# Patient Record
Sex: MALE | Race: WHITE | Employment: PART TIME | URBAN - METROPOLITAN AREA
[De-identification: names, ages, dates, MRNs, and addresses within clinical notes are randomized per-mention and may not be internally consistent; named-entity substitution may affect disease eponyms.]

---

## 2019-01-21 ENCOUNTER — TELEPHONE (OUTPATIENT)
Dept: ENDOCRINOLOGY | Age: 59
End: 2019-01-21

## 2019-01-21 ENCOUNTER — OFFICE VISIT (OUTPATIENT)
Dept: ENDOCRINOLOGY | Age: 59
End: 2019-01-21
Payer: COMMERCIAL

## 2019-01-21 VITALS — WEIGHT: 254.4 LBS | HEIGHT: 73 IN | BODY MASS INDEX: 33.72 KG/M2

## 2019-01-21 DIAGNOSIS — C73 THYROID CANCER (HCC): Primary | ICD-10-CM

## 2019-01-21 DIAGNOSIS — E55.9 VITAMIN D DEFICIENCY: ICD-10-CM

## 2019-01-21 DIAGNOSIS — E89.0 POST-SURGICAL HYPOTHYROIDISM: ICD-10-CM

## 2019-01-21 PROCEDURE — 99214 OFFICE O/P EST MOD 30 MIN: CPT | Performed by: INTERNAL MEDICINE

## 2019-01-21 RX ORDER — LEVOTHYROXINE SODIUM 0.2 MG/1
200 TABLET ORAL DAILY
COMMUNITY
End: 2019-01-21 | Stop reason: SDUPTHER

## 2019-01-21 RX ORDER — LEVOTHYROXINE SODIUM 0.2 MG/1
TABLET ORAL
Qty: 90 TABLET | Refills: 5 | Status: SHIPPED | OUTPATIENT
Start: 2019-01-21 | End: 2019-07-23 | Stop reason: SDUPTHER

## 2019-01-21 RX ORDER — PANTOPRAZOLE SODIUM 40 MG/1
40 TABLET, DELAYED RELEASE ORAL DAILY
COMMUNITY

## 2019-01-21 RX ORDER — CELECOXIB 200 MG/1
200 CAPSULE ORAL PRN
COMMUNITY

## 2019-01-21 RX ORDER — ATENOLOL 25 MG/1
25 TABLET ORAL DAILY
COMMUNITY

## 2019-01-21 RX ORDER — CYANOCOBALAMIN 1000 UG/ML
1000 INJECTION INTRAMUSCULAR; SUBCUTANEOUS ONCE
COMMUNITY

## 2019-01-21 RX ORDER — ACYCLOVIR 200 MG/1
200 CAPSULE ORAL NIGHTLY
COMMUNITY

## 2019-01-21 RX ORDER — LISINOPRIL 20 MG/1
20 TABLET ORAL DAILY
COMMUNITY

## 2019-01-21 RX ORDER — SIMVASTATIN 10 MG
10 TABLET ORAL NIGHTLY
COMMUNITY

## 2019-01-21 RX ORDER — CHOLECALCIFEROL (VITAMIN D3) 1250 MCG
CAPSULE ORAL
COMMUNITY

## 2019-01-21 RX ORDER — CHLORTHALIDONE 25 MG/1
12.5 TABLET ORAL DAILY
COMMUNITY

## 2019-01-21 RX ORDER — FLUTICASONE PROPIONATE 50 MCG
1 SPRAY, SUSPENSION (ML) NASAL PRN
COMMUNITY

## 2019-01-21 RX ORDER — GABAPENTIN 100 MG/1
100 CAPSULE ORAL 3 TIMES DAILY PRN
COMMUNITY

## 2019-01-21 RX ORDER — SILDENAFIL CITRATE 20 MG/1
20 TABLET ORAL PRN
COMMUNITY

## 2019-01-29 ENCOUNTER — TELEPHONE (OUTPATIENT)
Dept: ENDOCRINOLOGY | Age: 59
End: 2019-01-29

## 2019-02-07 DIAGNOSIS — C73 THYROID CANCER (HCC): ICD-10-CM

## 2019-02-08 ENCOUNTER — TELEPHONE (OUTPATIENT)
Dept: ENDOCRINOLOGY | Age: 59
End: 2019-02-08

## 2019-07-16 DIAGNOSIS — E89.0 POST-SURGICAL HYPOTHYROIDISM: ICD-10-CM

## 2019-07-16 DIAGNOSIS — C73 THYROID CANCER (HCC): ICD-10-CM

## 2019-07-16 LAB
T4 FREE: 1.3
TSH SERPL DL<=0.05 MIU/L-ACNC: 0.03 UIU/ML

## 2019-07-23 ENCOUNTER — TELEPHONE (OUTPATIENT)
Dept: ENDOCRINOLOGY | Age: 59
End: 2019-07-23

## 2019-07-23 ENCOUNTER — OFFICE VISIT (OUTPATIENT)
Dept: ENDOCRINOLOGY | Age: 59
End: 2019-07-23
Payer: COMMERCIAL

## 2019-07-23 VITALS
OXYGEN SATURATION: 98 % | HEART RATE: 72 BPM | HEIGHT: 73 IN | WEIGHT: 250 LBS | SYSTOLIC BLOOD PRESSURE: 128 MMHG | BODY MASS INDEX: 33.13 KG/M2 | RESPIRATION RATE: 16 BRPM | DIASTOLIC BLOOD PRESSURE: 78 MMHG

## 2019-07-23 DIAGNOSIS — E55.9 VITAMIN D DEFICIENCY: ICD-10-CM

## 2019-07-23 DIAGNOSIS — C73 THYROID CANCER (HCC): Primary | ICD-10-CM

## 2019-07-23 DIAGNOSIS — E89.0 POST-SURGICAL HYPOTHYROIDISM: ICD-10-CM

## 2019-07-23 PROCEDURE — 99214 OFFICE O/P EST MOD 30 MIN: CPT | Performed by: INTERNAL MEDICINE

## 2019-07-23 RX ORDER — LEVOTHYROXINE SODIUM 0.2 MG/1
TABLET ORAL
Qty: 90 TABLET | Refills: 5 | Status: SHIPPED | OUTPATIENT
Start: 2019-07-23 | End: 2020-01-20 | Stop reason: SDUPTHER

## 2019-07-23 NOTE — LETTER
ultrasound-guided FNA of the cervical lymph node. Thyroid neck ultrasound on 2012 in \[facility]\"", showed 7 x 5 mm hypoechoic nodule in the left thyroidectomy bed. This lymph node was too small for FNA. Neck MRI on 2012, did not show any significant abnormality, the lymph nodes seen on the prior ultrasound in [facility] did not look enlarged. Thyroid ultrasound on 2012(in [facility]): unchanged 5 x 3 mm left level 6 central compartment nodule, there was no worrisome cervical lymphadenopathy. Thyroid ultrasound on 2013(by Dr Wanda Cantu): no local recurrence, no cervical SAROJ, right neck level 6-4 3 mm hypoechoic nodule. 2014: Thyroid US; negative  2014, Thyrogen stimulated T, TSH:85(Thyroge stimulated TG was 11 in )  Thyrogen stimulated Tg level; 2015 --> TSH 33, TG 2.6     Whole Body scan 2016 --> negative for any evidence of recurrence     Thyroid US 2015, 2016, 2017 and 2018  at [facility] --> No evidence of recurrence and no abnormal LN   Thyroid US 2019 at [facility] --> No evidence of recurrence and no abnormal LN     Currently on levothyroxine 200 mcg daily and 1/2 tab   Most recent labs  2019 - TSH 0.033, Tg level 0.2   Tg 2019 0.2 was 0.2 was 0.5 was 0.3 was 0.4     Last year the patient was admitted to [facility] with SVT () and underwent successful cardiac ablation. He is currently stable follow up with cardiology.     The patient denies any new lumps, bumps in her neck, change in her voice, or shortness of breath    PAST MEDICAL HISTORY   Past Medical History:   Diagnosis Date    Hyperlipidemia     Hypertension     Post-surgical hypothyroidism     Thyroid cancer ([address]     Vitamin B12 deficiency     Vitamin D deficiency      PAST SURGICAL HISTORY   Past Surgical History:   Procedure Laterality Date    TOTAL THYROIDECTOMY      for thyroid cancer      SOCIAL HISTORY   Social History     Socioeconomic History

## 2019-07-23 NOTE — PROGRESS NOTES
5/2018  at [facility] --> No evidence of recurrence and no abnormal LN   Thyroid US 2/2019 at [facility] --> No evidence of recurrence and no abnormal LN     Currently on levothyroxine 200 mcg daily and 1/2 tab Sundary  Most recent labs  7/18/2019 - TSH 0.033, Tg level 0.2   Tg 7/2019 0.2 was 0.2 was 0.5 was 0.3 was 0.4     Last year the patient was admitted to [facility] with SVT () and underwent successful cardiac ablation. He is currently stable follow up with cardiology. The patient denies any new lumps, bumps in her neck, change in her voice, or shortness of breath    PAST MEDICAL HISTORY   Past Medical History:   Diagnosis Date    Hyperlipidemia     Hypertension     Post-surgical hypothyroidism     [facility] ([facility] [address]     Vitamin B12 deficiency     Vitamin D deficiency      PAST SURGICAL HISTORY   Past Surgical History:   Procedure Laterality Date    TOTAL THYROIDECTOMY      for thyroid cancer      SOCIAL HISTORY   Social History     Socioeconomic History    Marital status:      Spouse name: Not on file    Number of children: Not on file    Years of education: Not on file    Highest education level: Not on file   Occupational History    Not on file   Social Needs    Financial resource strain: Not on file    Food insecurity:     Worry: Not on file     Inability: Not on file    Transportation needs:     Medical: Not on file     Non-medical: Not on file   Tobacco Use    Smoking status: Former Smoker    Smokeless tobacco: Never Used    Tobacco comment: quit in 2004    Substance and Sexual Activity    Alcohol use:  Yes     Alcohol/week: 12.0 standard drinks     Types: 12 Cans of beer per week    Drug use: No    Sexual activity: Not on file   Lifestyle    Physical activity:     Days per week: Not on file     Minutes per session: Not on file    Stress: Not on file   Relationships    Social connections:     Talks on phone: Not on file     Gets together: Not on file     Attends [employer] service: Not on file     Active member of club or organization: Not on file     Attends meetings of clubs or organizations: Not on file     Relationship status: Not on file    Intimate partner violence:     Fear of current or ex partner: Not on file     Emotionally abused: Not on file     Physically abused: Not on file     Forced sexual activity: Not on file   Other Topics Concern    Not on file   Social History Narrative    Not on file     FAMILY HISTORY   Family History   Problem Relation Age of Onset    Stroke Father     Cancer Father         Head/Neck/Throat    Cancer Sister         Testicular     Thyroid Cancer Neg Hx      ALLERGIES AND DRUG REACTIONS   Allergies   Allergen Reactions    Penicillins     Strawberry (Diagnostic)        CURRENT MEDICATIONS     Current Outpatient Medications   Medication Sig Dispense Refill    fluticasone (FLONASE) 50 MCG/ACT nasal spray 1 spray by Each Nostril route as needed       atenolol (TENORMIN) 25 MG tablet Take 25 mg by mouth daily 6.25 tab in the am and 1 tablet at bedtime      pantoprazole (PROTONIX) 40 MG tablet Take 40 mg by mouth daily      aspirin 81 MG tablet Take 81 mg by mouth daily      lisinopril (PRINIVIL;ZESTRIL) 20 MG tablet Take 20 mg by mouth daily       Multiple Vitamin (MULTI-DAY VITAMINS PO) Take by mouth daily      simvastatin (ZOCOR) 10 MG tablet Take 10 mg by mouth nightly      Calcium-Magnesium 500-250 MG TABS Take by mouth daily      Coenzyme Q10 (COQ-10) 100 MG CAPS Take by mouth nightly      acyclovir (ZOVIRAX) 200 MG capsule Take 200 mg by mouth nightly      sildenafil (REVATIO) 20 MG tablet Take 20 mg by mouth as needed      gabapentin (NEURONTIN) 100 MG capsule Take 100 mg by mouth 3 times daily as needed. Art Parmar celecoxib (CELEBREX) 200 MG capsule Take 200 mg by mouth as needed for Pain      cyanocobalamin 1000 MCG/ML injection Inject 1,000 mcg into the muscle once Twice a month      Cholecalciferol (VITAMIN D3) 80438 · Goal TSH <0.5   · Tg level still detectable but low and stable. He continues to have detectable thyroglobulin levels without clinically significant diease. · Previous Thyroid US, Whole body scan, PET/CT and MRI have been negative  · Thyrogen stimulated thyroglobulin level was 2.6 in 8/14/2015  · Whole Body scan 8/2016 --> negative   · Will repeat Thyroid US at [facility] in 2/2020     Postsurgical hypothyroidism   · At goal, continue levothyroxine to 200 mcg, 1 tab 6 days a week and 1/2 tab on Sunday   · Goal TSH <0.5     vitD deficiency   · continue vitD supplements   · Encourage Wt beating exercise     Return in about 6 months (around 1/23/2020) for Thyroid cancer . The above issues were reviewed with the patient who understood and agreed with the plan. 30 minutes were spent today in management of this patient. More than 50% of time spent on counseling of patient on above diagnosis. Thank you for allowing us to participate in the care of this patient. Please do not hesitate to contact us with any additional questions. Diagnosis Orders   1. Thyroid cancer (HCC)  TSH without Reflex    Thyroglobulin    T4, Free    US Thyroid    Basic Metabolic Panel    levothyroxine (SYNTHROID) 200 MCG tablet   2. Post-surgical hypothyroidism  TSH without Reflex    Thyroglobulin    T4, Free    Basic Metabolic Panel    levothyroxine (SYNTHROID) 200 MCG tablet   3.  Vitamin D deficiency  US Thyroid       Clover Stringer MD  Endocrinologist, [facility])   [address] [facility] [address]   Phone: [phone]  Fax: [phone]

## 2019-07-23 NOTE — TELEPHONE ENCOUNTER
Pt scheduled for a thyroid US at [facility] main for Monday 12-9-19 at 8am. Patient is aware, and he knows where the facility is. I faxed the order to [facility] at [facility]. Please see the scanned media.

## 2019-12-11 DIAGNOSIS — C73 THYROID CANCER (HCC): ICD-10-CM

## 2019-12-11 DIAGNOSIS — E55.9 VITAMIN D DEFICIENCY: ICD-10-CM

## 2020-01-15 LAB
BUN BLDV-MCNC: NORMAL MG/DL
CALCIUM SERPL-MCNC: 9 MG/DL
CHLORIDE BLD-SCNC: NORMAL MMOL/L
CO2: NORMAL
CREAT SERPL-MCNC: 1.08 MG/DL
GFR CALCULATED: 74
GLUCOSE BLD-MCNC: NORMAL MG/DL
POTASSIUM SERPL-SCNC: 3.8 MMOL/L
SODIUM BLD-SCNC: 144 MMOL/L
T4 FREE: 1.33

## 2020-01-20 ENCOUNTER — OFFICE VISIT (OUTPATIENT)
Dept: ENDOCRINOLOGY | Age: 60
End: 2020-01-20
Payer: COMMERCIAL

## 2020-01-20 VITALS
OXYGEN SATURATION: 98 % | SYSTOLIC BLOOD PRESSURE: 142 MMHG | DIASTOLIC BLOOD PRESSURE: 82 MMHG | BODY MASS INDEX: 33.72 KG/M2 | RESPIRATION RATE: 16 BRPM | WEIGHT: 254.4 LBS | HEIGHT: 73 IN | HEART RATE: 63 BPM

## 2020-01-20 PROCEDURE — 99214 OFFICE O/P EST MOD 30 MIN: CPT | Performed by: INTERNAL MEDICINE

## 2020-01-20 RX ORDER — LEVOTHYROXINE SODIUM 0.2 MG/1
TABLET ORAL
Qty: 90 TABLET | Refills: 5 | Status: SHIPPED
Start: 2020-01-20 | End: 2020-07-30 | Stop reason: SDUPTHER

## 2020-01-20 NOTE — PROGRESS NOTES
I-131. Posttherapy scan on 2011, did not show any evidence of metastatic disease. 2011, thyroglobulin antibody was less than 20, thyroglobulin was 11.4, TSH was 78. 2011, neck CT did not show any lymphadenopathy or residual thyroid tissue. in 2012 , thyroglobulin level was detectable with suppressed TSH: TSH was 0.02, free T4 was 1.3, thyroglobulin was 1.4, with negative thyroglobulin antibodies. The thyroid ultrasound completed in [facility] suggested submandibular lymphadenopathy. At that time, we asked a second opinion from Dr. Nayeli White at [facility] , for further evaluation of detectable thyroglobulin levels and ultrasound-guided FNA of the cervical lymph node. Thyroid neck ultrasound on 2012 in [facility], showed 7 x 5 mm hypoechoic nodule in the left thyroidectomy bed. This lymph node was too small for FNA. Neck MRI on 2012, did not show any significant abnormality, the lymph nodes seen on the prior ultrasound in [facility] did not look enlarged. Thyroid ultrasound on 2012(in pres): unchanged 5 x 3 mm left level 6 central compartment nodule, there was no worrisome cervical lymphadenopathy. Thyroid ultrasound on 2013(by Dr Lynette Vaughan): no local recurrence, no cervical SAROJ, right neck level 6-4 3 mm hypoechoic nodule.      2014: Thyroid US; negative  2014, Thyrogen stimulated T, TSH:85(Thyroge stimulated TG was 11 in )  Thyrogen stimulated Tg level; 2015 --> TSH 33, TG 2.6     Whole Body scan 2016 --> negative for any evidence of recurrence     Thyroid US 2015, 2016, 2017 and 2018  at [facility] --> No evidence of recurrence and no abnormal LN     Thyroid US 2019 at [facility] --> No evidence of recurrence or abnormal LN     Recent US 2019 at [facility] --> A right level 2 lymph node exhibits lobulation, heterogeneous hypoechoic appearance and no evidence of a central Gets together: Not on file     Attends [employer] service: Not on file     Active member of club or organization: Not on file     Attends meetings of clubs or organizations: Not on file     Relationship status: Not on file    Intimate partner violence:     Fear of current or ex partner: Not on file     Emotionally abused: Not on file     Physically abused: Not on file     Forced sexual activity: Not on file   Other Topics Concern    Not on file   Social History Narrative    Not on file     FAMILY HISTORY   Family History   Problem Relation Age of Onset    Stroke Father     Cancer Father         Head/Neck/Throat    Cancer Sister         Testicular     Thyroid Cancer Neg Hx      ALLERGIES AND DRUG REACTIONS   Allergies   Allergen Reactions    Penicillins     Strawberry (Diagnostic)        CURRENT MEDICATIONS     Current Outpatient Medications   Medication Sig Dispense Refill    levothyroxine (SYNTHROID) 200 MCG tablet Take 1 tablet 6 days a week and on 1/2 tablet on Sunday 90 tablet 5    fluticasone (FLONASE) 50 MCG/ACT nasal spray 1 spray by Each Nostril route as needed       atenolol (TENORMIN) 25 MG tablet Take 25 mg by mouth daily 1 3rd of a 25 mg in the am and a 25 mg at night      pantoprazole (PROTONIX) 40 MG tablet Take 40 mg by mouth daily      aspirin 81 MG tablet Take 81 mg by mouth daily      lisinopril (PRINIVIL;ZESTRIL) 20 MG tablet Take 20 mg by mouth daily       Multiple Vitamin (MULTI-DAY VITAMINS PO) Take by mouth daily      simvastatin (ZOCOR) 10 MG tablet Take 10 mg by mouth nightly      Calcium-Magnesium 500-250 MG TABS Take by mouth daily      Coenzyme Q10 (COQ-10) 100 MG CAPS Take by mouth nightly      acyclovir (ZOVIRAX) 200 MG capsule Take 200 mg by mouth nightly      sildenafil (REVATIO) 20 MG tablet Take 20 mg by mouth as needed      gabapentin (NEURONTIN) 100 MG capsule Take 100 mg by mouth 3 times daily as needed. Radhames Isaac celecoxib (CELEBREX) 200 MG capsule Take 200 mg by mouth as needed for Pain      cyanocobalamin 1000 MCG/ML injection Inject 1,000 mcg into the muscle once Twice a month      Cholecalciferol (VITAMIN D3) 34885 units CAPS Take by mouth Twice a month      chlorthalidone (HYGROTON) 25 MG tablet Take 12.5 mg by mouth daily      thyrotropin juliane (THYROGEN) 1.1 MG injection Inject 0.9 mg into the muscle every 24 hours x2 doses. Lab work is to be done BEFORE the 1st dose is given and 48hrs AFTER the 2nd dose is given. 2 each 0     No current facility-administered medications for this visit. Review of Systems  Constitutional: No fever, no chills, no diaphoresis, no generalized weakness. HEENT: No blurred vision, No sore throat, no ear pain, no hair loss  Neck: denied any neck swelling, difficulty swallowing,   Cadrdiopulomary: No CP, SOB or palpitation, No orthopnea or PND. No cough or wheezing. GI: No N/V/D, no constipation, No abdominal pain, no melena or hematochezia   : Denied any dysuria, hematuria, flank pain, discharge, or incontinence. Skin: denied any rash, ulcer, Hirsute, or hyperpigmentation. MSK: denied any joint deformity, joint pain/swelling, muscle pain, or back pain. Neuro: no numbess, no tingling, no weakness,     OBJECTIVE    BP (!) 142/82 (Site: Left Upper Arm, Position: Sitting, Cuff Size: Medium Adult)   Pulse 63   Resp 16   Ht 6' 1\" (1.854 m)   Wt 254 lb 6.4 oz (115.4 kg)   SpO2 98%   BMI 33.56 kg/m²   BP Readings from Last 4 Encounters:   01/20/20 (!) 142/82   07/23/19 128/78     Wt Readings from Last 6 Encounters:   01/20/20 254 lb 6.4 oz (115.4 kg)   07/23/19 250 lb (113.4 kg)   01/21/19 254 lb 6.4 oz (115.4 kg)       Physical examination:  General: awake alert, oriented x3, no abnormal position or movements. HEENT: normocephalic non traumatic  Neck: supple, no LN enlargement, s/p total thyroidectomy, no JVD. Pulm: Clear equal air entry no added sounds, no wheezing or rhonchi    CVS: S1 + S2, no murmur, no heave.

## 2020-01-29 ENCOUNTER — TELEPHONE (OUTPATIENT)
Dept: ENDOCRINOLOGY | Age: 60
End: 2020-01-29

## 2020-01-29 NOTE — TELEPHONE ENCOUNTER
After reviewing the addendum to the US report done at [facility], did you still want to proceed with the thyrogen stim test?

## 2020-01-30 NOTE — TELEPHONE ENCOUNTER
Please let him know that [facility] couldn't find previous US images to compare with this US    I recommend proceedign with Thyrogen stimulated thyroglobulin and Whole body scan      Protocol for Thyrogen Stimulated I131 Whole Body Scan  Begin low iodine diet 10 days before day of 1st thyrogen injection   Decrease levothyroxine 10 days before day of 1st thyrogen injection     · Day 1 (monday): Urine sample for iodine and blood work (TSH, thyroglobulin), Thyrogen injection #1 in endocrine clinic   · Day 2 (Tuesday)  Thyrogen injection #2 in endocrine clinic   · Day 3 (Wednesday)  Blood work for thyroglobulin and TSH and go to nuclear medicine for I-123 GALINDO   · Day 4 (Thursday)  Whole body  scan in Nuclear Medicine

## 2020-02-28 ENCOUNTER — TELEPHONE (OUTPATIENT)
Dept: ENDOCRINOLOGY | Age: 60
End: 2020-02-28

## 2020-02-28 NOTE — TELEPHONE ENCOUNTER
I spoke to [employer] at [facility]. There is no auth necessary for Thyrogen [id number]. No auth necessary for the WBS as well WZR#[id number].

## 2020-03-16 LAB — TSH SERPL DL<=0.05 MIU/L-ACNC: 0.24 UIU/ML

## 2020-03-18 LAB — TSH SERPL DL<=0.05 MIU/L-ACNC: 92.6 UIU/ML

## 2020-03-25 ENCOUNTER — TELEPHONE (OUTPATIENT)
Dept: ENDOCRINOLOGY | Age: 60
End: 2020-03-25

## 2020-07-20 ENCOUNTER — VIRTUAL VISIT (OUTPATIENT)
Dept: ENDOCRINOLOGY | Age: 60
End: 2020-07-20
Payer: COMMERCIAL

## 2020-07-20 PROCEDURE — 99214 OFFICE O/P EST MOD 30 MIN: CPT | Performed by: INTERNAL MEDICINE

## 2020-07-20 NOTE — LETTER
[address] [place] [address] Department of Endocrinology Diabetes and Metabolism   [address] [place] [address]   Phone: [phone]  Fax: [phone]      Provider: Mary Cruz MD  Primary Care Physician: Peyton Brooks MD   Referring Provider: No ref. provider found    Patient: Ulises Romero  YOB: 1960  Date of Visit: 7/20/2020      Dear Dr. Peyton Brooks MD   I had the pleasure of seeing your patient Ulises Romero today at endocrine clinic for follow up visit and I enclosed a copy of the office visit completed today. Thank you very much for asking us to participate in the care of this very pleasant patient. Please don't hesitate to call if there are any further questions or concerns. Sincerely   Mary Cruz MD  Endocrinologist, [address] [address]   Phone: [phone]  Fax: [phone]      [address] [place] [address] Department of Endocrinology Diabetes and Metabolism   [address] [place] [address], [address] [place] [address],[place] [address]   Phone: [phone]  Fax: [phone]    Date of Service: 7/20/2020    Primary Care Physician: Peyton Brooks MD.  Provider: Mary Cruz MD       Reason for the visit:  Metastatic thyroid cancer, post surgical hypothyroidism     History of Present Illness: The history is provided by the patient. No  was used. Accuracy of the patient data is excellent. Alvin Lama is a very pleasant  62y.o. year old male seen today today for follow up visit on papillary thyroid cancer    He was first diagnosed with papillary thyroid cancer after a left hemithyroidectomy in December 2009; this showed a papillary thyroid cancer, diffuse sclerosing variant, focal clear cell morphology, 2.2 cm, stage IV, T4a N1a M0.  There was evidence of capsular invasion in superior thyroid and paratracheal soft tissue and presumed angiolymphatic invasion, carcinoma was focally present at the resection margin. 2 lymph nodes were resected and found to be positive for metastatic disease with largest measuring 0.9 cm. The patient underwent right completion thyroidectomy in January 2010. Received GALINDO-ablation, with withdrawal, 150 mCi I-131 on January 27, 2010. Postablation whole body scan on February 03, 2010, showed possible left neck submandibular increased activity with questionable disease between umbilicus and pubic symphysis. March 31, 2010, neck CT did not show any focal abnormality in the submandibular region. November 05, 2010, thyroglobulin was 16, TSH was not documented at that time. November 03, 2010, Thyrogen stimulated whole body scan did not show the previously described activity in the thyroid bed or submandibular region. No abnormal activity was seen, negative for metastasis. PET/CT on May 26, 2011, did not show any metastatic disease    July 2011, Thyrogen stimulated thyroglobulin level was positive 11, with negative thyroglobulin antibodies. He was treated again with 154 mCi of I-131. Posttherapy scan on July 27, 2011, did not show any evidence of metastatic disease. July 22, 2011, thyroglobulin antibody was less than 20, thyroglobulin was 11.4, TSH was 78. August 24, 2011, neck CT did not show any lymphadenopathy or residual thyroid tissue. in January 2012 , thyroglobulin level was detectable with suppressed TSH: TSH was 0.02, free T4 was 1.3, thyroglobulin was 1.4, with negative thyroglobulin antibodies. The thyroid ultrasound completed in [facility] suggested submandibular lymphadenopathy. At that time, we asked a second opinion from Dr. Toñito Mcqueen at [facility] , for further evaluation of detectable thyroglobulin levels and ultrasound-guided FNA of the cervical lymph node.     Thyroid neck ultrasound on January 26, 2012 in [facility], showed 7 x 5 mm hypoechoic nodule in the left thyroidectomy bed. This lymph node was too small for FNA. Neck MRI on 2012, did not show any significant abnormality, the lymph nodes seen on the prior ultrasound in [facility] did not look enlarged. Thyroid ultrasound on 2012(in [facility]): unchanged 5 x 3 mm left level 6 central compartment nodule, there was no worrisome cervical lymphadenopathy. Thyroid ultrasound on 2013(by Dr Charlie Lazo): no local recurrence, no cervical SAROJ, right neck level 6-4 3 mm hypoechoic nodule. 2014: Thyroid US; negative  2014, Thyrogen stimulated T, TSH:85(Thyroge stimulated TG was 11 in )  Thyrogen stimulated Tg level; 2015 --> TSH 33, TG 2.6     Whole Body scan 2016 --> negative for any evidence of recurrence     Thyroid US 2015, 2016, 2017 and 2018  at [facility] --> No evidence of recurrence and no abnormal LN     Thyroid US 2019 at [facility] --> No evidence of recurrence or abnormal LN     Recent US 2019 at [facility] --> A right level 2 lymph node exhibits lobulation, heterogeneous hypoechoic appearance and no evidence of a central hyperechoic zone. Dimensions are 15 x 10 x 6 mm. A similar appearing left level 3 lymph node exhibits dimensions of 14 x 6 x 5 mm    1/15/2020 - TSH 0.011, Free T4 1.33, Tg-level 0.2 (negative Ab)     Thyrogen stim test 3/18/2020 -->  TSH 92.6, Tg level 0.4 with negative Tg-Ab     3/20/2020 - [facility] --> There is no abnormal increased tracer activity in the  right or left thyroid bed to suggest residual or recurrent disease in the neck. Currently on levothyroxine 200 mcg daily and 1/2 tab   Due for labs now       Tg  0.2 was 0.2 was 0.5 was 0.3 was 0.4     Last year the patient was admitted to [facility] with SVT () and underwent successful cardiac ablation. He is currently stable follow up with cardiology.     The patient denies any new lumps, bumps in her neck, change in her voice,  gabapentin (NEURONTIN) 100 MG capsule Take 100 mg by mouth 3 times daily as needed. Normat Sandro celecoxib (CELEBREX) 200 MG capsule Take 200 mg by mouth as needed for Pain      cyanocobalamin 1000 MCG/ML injection Inject 1,000 mcg into the muscle once Twice a month      Cholecalciferol (VITAMIN D3) 41038 units CAPS Take by mouth Twice a month      chlorthalidone (HYGROTON) 25 MG tablet Take 12.5 mg by mouth daily       No current facility-administered medications for this visit. Review of Systems  Constitutional: No fever, no chills, no diaphoresis, no generalized weakness. HEENT: No blurred vision, No sore throat, no ear pain, no hair loss  Neck: denied any neck swelling, difficulty swallowing,   Cadrdiopulomary: No CP, SOB or palpitation, No orthopnea or PND. No cough or wheezing. GI: No N/V/D, no constipation, No abdominal pain, no melena or hematochezia   : Denied any dysuria, hematuria, flank pain, discharge, or incontinence. Skin: denied any rash, ulcer, Hirsute, or hyperpigmentation. MSK: denied any joint deformity, joint pain/swelling, muscle pain, or back pain. Neuro: no numbess, no tingling, no weakness,     OBJECTIVE    There were no vitals taken for this visit. BP Readings from Last 4 Encounters:   01/20/20 (!) 142/82   07/23/19 128/78     Wt Readings from Last 6 Encounters:   01/20/20 254 lb 6.4 oz (115.4 kg)   07/23/19 250 lb (113.4 kg)   01/21/19 254 lb 6.4 oz (115.4 kg)     Physical examination:  Due to this being a TeleHealth encounter, evaluation of the following organ systems is limited: Vitals/Constitutional/EENT/Resp/CV/GI//MS/Neuro/Skin/Heme-Lymph-Imm. Modified physical exam through Telemedicine camera    General: Communicating well via camera   Neck: no obvious neck mass. No obvious neck deformity     CVS: no distress   Chest: no distress.  Chest is moving with respiration    Extremities:  no visible tremor  Skin: No visible rashes as seen from camera Musculoskeletal: no visible deformity  Neuro: Alert and oriented to person, place, and time. Psychiatric: Normal mood and affect. Behavior is normal    Review of Laboratory Data:  I have reviewed the following:  No results found for: WBC, RBC, HGB, HCT, MCV, MCH, MCHC, RDW, PLT, MPV, GRANULOCYTES, BANDS   Lab Results   Component Value Date/Time     01/15/2020    K 3.8 01/15/2020    CALCIUM 9.0 01/15/2020      Lab Results   Component Value Date/Time    TSH 92.600 03/18/2020    T4FREE 1.33 01/15/2020     No results found for: LABA1C, GLUCOSE, MALBCR, LABMICR, LABCREA  Lab Results   Component Value Date/Time    TSH 92.600 03/18/2020    T4FREE 1.33 01/15/2020     No results found for: PTH  No results found for: LABA1C  No results found for: VITD25    TSH W/REFLEX TO FREE T4 (01/15/2020 7:24 AM EST)  Component Value   TSH  0.011    FREE T4 (01/15/2020 7:24 AM EST)  Component Value   FREE T4 1.33     THYROGLOBULIN PANEL (01/15/2020 7:24 AM EST)  Component Value   Thyroglobulin 0.2 (L)     Medical Records/Labs/Images review:   I personally reviewed and summarized previous records   All labs and imaging were reviewed independently     [address] is a 62y.o. -year-old male who was diagnosed with metastatic papillary thyroid cancer in December 2009, stage Romario, T4a N1a M0. Metastatic Papillary thyroid Cancer   · He is a high risk patient with biochemical incomplete response. Doing better now   · At goal, continue levothyroxine to 200 mcg, 1 tab 6 days a week and 1/2 tab on Sunday   · Goal TSH <0.5   · Tg level still detectable but stable: 0.2.  He continues to have detectable thyroglobulin levels without clinically significant disease  · Thyroid US at [facility] 12/19 demonstrated no residual thyroid gland tissue, but multiple bilateral cervical lymph nodes, including a right level 2 lymph node exhibits lobulation, heterogeneous hypoechoic appearance and no evidence of a central hyperechoic zone. Dimensions are 15 x 10 x 6 mm. A similar appearing left level 3 lymph node exhibits dimensions of 14 x 6 x 5 mm. · Thyrogen stim test 3/18/2020 -->  TSH 92.6, Tg level 0.4 with negative Tg-Ab   · 3/20/2020 - [facility] --> There is no abnormal increased tracer activity in the  right or left thyroid bed to suggest residual or recurrent disease in the neck. Postsurgical hypothyroidism   · Currently on evothyroxine to 200 mcg, 1 tab 6 days a week and 1/2 tab on Sunday   · Goal TSH <0.5  · Check TFT now     vitD deficiency   · continue vitD supplements   · Encourage Wt bearing exercise     Return in about 1 year (around 7/20/2021) for thyroid cancer . The above issues were reviewed with the patient who understood and agreed with the plan. Thank you for allowing us to participate in the care of this patient. Please do not hesitate to contact us with any additional questions. Diagnosis Orders   1. Thyroid cancer ([facility] [address]  TSH without Reflex    T4, Free    TSH without Reflex    Thyroglobulin    T4, Free    Basic Metabolic Panel    US Thyroid   2. Post-surgical hypothyroidism  TSH without Reflex    T4, Free    TSH without Reflex    T4, Free   3. Vitamin D deficiency  Basic Metabolic Panel       Pritesh Barr MD  Endocrinologist, [facility])   [address] [facility], [address] [place] [address],[facility] [address]   Phone: [phone]  Fax: [phone]  ----------------------------  An electronic signature was used to authenticate this note. Ga Ulrich MD on 7/20/2020 at [facility] were provided through a video synchronous discussion virtually to substitute for in-person clinic visit.     Pursuant to the emergency declaration under the [facility] [address] authority and the [place] and [employer] Act, this Virtual  Visit was conducted, with patient's consent, to reduce the patient's risk of exposure to COVID-19 and provide continuity of care.

## 2020-07-20 NOTE — PROGRESS NOTES
[address] [place] [address] Department of Endocrinology Diabetes and Metabolism   [address] [facility] [address]   Phone: [phone]  Fax: [phone]    Date of Service: 7/20/2020    Primary Care Physician: Foreign Rodriguez MD.  Provider: Socrates Stovall MD       Reason for the visit:  Metastatic thyroid cancer, post surgical hypothyroidism     History of Present Illness: The history is provided by the patient. No  was used. Accuracy of the patient data is excellent. Derick Llamas is a very pleasant  62y.o. year old male seen today today for follow up visit on papillary thyroid cancer    He was first diagnosed with papillary thyroid cancer after a left hemithyroidectomy in December 2009; this showed a papillary thyroid cancer, diffuse sclerosing variant, focal clear cell morphology, 2.2 cm, stage IV, T4a N1a M0. There was evidence of capsular invasion in superior thyroid and paratracheal soft tissue and presumed angiolymphatic invasion, carcinoma was focally present at the resection margin. 2 lymph nodes were resected and found to be positive for metastatic disease with largest measuring 0.9 cm. The patient underwent right completion thyroidectomy in January 2010. Received GALINDO-ablation, with withdrawal, 150 mCi I-131 on January 27, 2010. Postablation whole body scan on February 03, 2010, showed possible left neck submandibular increased activity with questionable disease between umbilicus and pubic symphysis. March 31, 2010, neck CT did not show any focal abnormality in the submandibular region. November 05, 2010, thyroglobulin was 16, TSH was not documented at that time. November 03, 2010, Thyrogen stimulated whole body scan did not show the previously described activity in the thyroid bed or submandibular region. No abnormal activity was seen, negative for metastasis.      PET/CT on May 26, 2011, did not show any metastatic disease    July , Thyrogen stimulated thyroglobulin level was positive 11, with negative thyroglobulin antibodies. He was treated again with 154 mCi of I-131. Posttherapy scan on 2011, did not show any evidence of metastatic disease. 2011, thyroglobulin antibody was less than 20, thyroglobulin was 11.4, TSH was 78. 2011, neck CT did not show any lymphadenopathy or residual thyroid tissue. in 2012 , thyroglobulin level was detectable with suppressed TSH: TSH was 0.02, free T4 was 1.3, thyroglobulin was 1.4, with negative thyroglobulin antibodies. The thyroid ultrasound completed in [facility] suggested submandibular lymphadenopathy. At that time, we asked a second opinion from Dr. Angelique Mccray at [facility] , for further evaluation of detectable thyroglobulin levels and ultrasound-guided FNA of the cervical lymph node. Thyroid neck ultrasound on 2012 in [facility], showed 7 x 5 mm hypoechoic nodule in the left thyroidectomy bed. This lymph node was too small for FNA. Neck MRI on 2012, did not show any significant abnormality, the lymph nodes seen on the prior ultrasound in [facility] did not look enlarged. Thyroid ultrasound on 2012(in [place]): unchanged 5 x 3 mm left level 6 central compartment nodule, there was no worrisome cervical lymphadenopathy. Thyroid ultrasound on 2013(by Dr Kristina Diaz): no local recurrence, no cervical SAROJ, right neck level 6-4 3 mm hypoechoic nodule.      2014: Thyroid US; negative  2014, Thyrogen stimulated T, TSH:85(Thyroge stimulated TG was 11 in )  Thyrogen stimulated Tg level; 2015 --> TSH 33, TG 2.6     Whole Body scan 2016 --> negative for any evidence of recurrence     Thyroid US 2015, 2016, 2017 and 2018  at [facility] --> No evidence of recurrence and no abnormal LN     Thyroid US 2019 at [facility] --> No evidence of recurrence or abnormal LN     Recent US 12/9/2019 at [facility] --> A right level 2 lymph node exhibits lobulation, heterogeneous hypoechoic appearance and no evidence of a central hyperechoic zone. Dimensions are 15 x 10 x 6 mm. A similar appearing left level 3 lymph node exhibits dimensions of 14 x 6 x 5 mm    1/15/2020 - TSH 0.011, Free T4 1.33, Tg-level 0.2 (negative Ab)     Thyrogen stim test 3/18/2020 -->  TSH 92.6, Tg level 0.4 with negative Tg-Ab     3/20/2020 - [facility] --> There is no abnormal increased tracer activity in the  right or left thyroid bed to suggest residual or recurrent disease in the neck. Currently on levothyroxine 200 mcg daily and 1/2 tab Sundary  Due for labs now       Tg  0.2 was 0.2 was 0.5 was 0.3 was 0.4     Last year the patient was admitted to [facility] with SVT () and underwent successful cardiac ablation. He is currently stable follow up with cardiology. The patient denies any new lumps, bumps in her neck, change in her voice, or shortness of breath    PAST MEDICAL HISTORY   Past Medical History:   Diagnosis Date    Hyperlipidemia     Hypertension     Post-surgical hypothyroidism     Thyroid cancer ([facility] [address]     Vitamin B12 deficiency     Vitamin D deficiency      PAST SURGICAL HISTORY   Past Surgical History:   Procedure Laterality Date    TOTAL THYROIDECTOMY      for thyroid cancer      SOCIAL HISTORY   Tobacco:   reports that he has quit smoking. He has never used smokeless tobacco.  Alcol:   reports current alcohol use of about 12.0 standard drinks of alcohol per week. Illicit Drugs:   reports no history of drug use.     FAMILY HISTORY   Family History   Problem Relation Age of Onset    Stroke Father     Cancer Father         Head/Neck/Throat    Cancer Sister         Testicular     Thyroid Cancer Neg Hx      ALLERGIES AND DRUG REACTIONS   Allergies   Allergen Reactions    Penicillins     Strawberry (Diagnostic)        CURRENT MEDICATIONS     Current Outpatient Medications   Medication Sig Dispense Refill    levothyroxine (SYNTHROID) 200 MCG tablet Take 1 tablet 6 days a week and on 1/2 tablet on Sunday 90 tablet 5    fluticasone (FLONASE) 50 MCG/ACT nasal spray 1 spray by Each Nostril route as needed       atenolol (TENORMIN) 25 MG tablet Take 25 mg by mouth daily 1 3rd of a 25 mg in the am and a 25 mg at night      pantoprazole (PROTONIX) 40 MG tablet Take 40 mg by mouth daily      aspirin 81 MG tablet Take 81 mg by mouth daily      lisinopril (PRINIVIL;ZESTRIL) 20 MG tablet Take 20 mg by mouth daily       Multiple Vitamin (MULTI-DAY VITAMINS PO) Take by mouth daily      simvastatin (ZOCOR) 10 MG tablet Take 10 mg by mouth nightly      Calcium-Magnesium 500-250 MG TABS Take by mouth daily      Coenzyme Q10 (COQ-10) 100 MG CAPS Take by mouth nightly      acyclovir (ZOVIRAX) 200 MG capsule Take 200 mg by mouth nightly      sildenafil (REVATIO) 20 MG tablet Take 20 mg by mouth as needed      gabapentin (NEURONTIN) 100 MG capsule Take 100 mg by mouth 3 times daily as needed. Iva Rife celecoxib (CELEBREX) 200 MG capsule Take 200 mg by mouth as needed for Pain      cyanocobalamin 1000 MCG/ML injection Inject 1,000 mcg into the muscle once Twice a month      Cholecalciferol (VITAMIN D3) 67855 units CAPS Take by mouth Twice a month      chlorthalidone (HYGROTON) 25 MG tablet Take 12.5 mg by mouth daily       No current facility-administered medications for this visit. Review of Systems  Constitutional: No fever, no chills, no diaphoresis, no generalized weakness. HEENT: No blurred vision, No sore throat, no ear pain, no hair loss  Neck: denied any neck swelling, difficulty swallowing,   Cadrdiopulomary: No CP, SOB or palpitation, No orthopnea or PND. No cough or wheezing. GI: No N/V/D, no constipation, No abdominal pain, no melena or hematochezia   : Denied any dysuria, hematuria, flank pain, discharge, or incontinence. Skin: denied any rash, ulcer, Hirsute, or hyperpigmentation.    MSK: denied any joint deformity, joint pain/swelling, muscle pain, or back pain. Neuro: no numbess, no tingling, no weakness,     OBJECTIVE    There were no vitals taken for this visit. BP Readings from Last 4 Encounters:   01/20/20 (!) 142/82   07/23/19 128/78     Wt Readings from Last 6 Encounters:   01/20/20 254 lb 6.4 oz (115.4 kg)   07/23/19 250 lb (113.4 kg)   01/21/19 254 lb 6.4 oz (115.4 kg)     Physical examination:  Due to this being a TeleHealth encounter, evaluation of the following organ systems is limited: Vitals/Constitutional/EENT/Resp/CV/GI//MS/Neuro/Skin/Heme-Lymph-Imm. Modified physical exam through Telemedicine camera    General: Communicating well via camera   Neck: no obvious neck mass. No obvious neck deformity     CVS: no distress   Chest: no distress. Chest is moving with respiration    Extremities:  no visible tremor  Skin: No visible rashes as seen from camera   Musculoskeletal: no visible deformity  Neuro: Alert and oriented to person, place, and time. Psychiatric: Normal mood and affect.  Behavior is normal    Review of Laboratory Data:  I have reviewed the following:  No results found for: WBC, RBC, HGB, HCT, MCV, MCH, MCHC, RDW, PLT, MPV, GRANULOCYTES, BANDS   Lab Results   Component Value Date/Time     01/15/2020    K 3.8 01/15/2020    CALCIUM 9.0 01/15/2020      Lab Results   Component Value Date/Time    TSH 92.600 03/18/2020    T4FREE 1.33 01/15/2020     No results found for: LABA1C, GLUCOSE, MALBCR, LABMICR, LABCREA  Lab Results   Component Value Date/Time    TSH 92.600 03/18/2020    T4FREE 1.33 01/15/2020     No results found for: PTH  No results found for: LABA1C  No results found for: VITD25    TSH W/REFLEX TO FREE T4 (01/15/2020 7:24 AM EST)  Component Value   TSH  0.011    FREE T4 (01/15/2020 7:24 AM EST)  Component Value   FREE T4 1.33     THYROGLOBULIN PANEL (01/15/2020 7:24 AM EST)  Component Value   Thyroglobulin 0.2 (L)     Medical Records/Labs/Images review:   I personally reviewed and summarized previous records   All labs and imaging were reviewed independently     [address] Sukumar Moura is a 62y.o. -year-old male who was diagnosed with metastatic papillary thyroid cancer in December 2009, stage Romario, T4a N1a M0. Metastatic Papillary thyroid Cancer   · He is a high risk patient with biochemical incomplete response. Doing better now   · At goal, continue levothyroxine to 200 mcg, 1 tab 6 days a week and 1/2 tab on Sunday   · Goal TSH <0.5   · Tg level still detectable but stable: 0.2. He continues to have detectable thyroglobulin levels without clinically significant disease  · Thyroid US at [facility] 12/19 demonstrated no residual thyroid gland tissue, but multiple bilateral cervical lymph nodes, including a right level 2 lymph node exhibits lobulation, heterogeneous hypoechoic appearance and no evidence of a central hyperechoic zone. Dimensions are 15 x 10 x 6 mm. A similar appearing left level 3 lymph node exhibits dimensions of 14 x 6 x 5 mm. · Thyrogen stim test 3/18/2020 -->  TSH 92.6, Tg level 0.4 with negative Tg-Ab   · 3/20/2020 - [facility] --> There is no abnormal increased tracer activity in the  right or left thyroid bed to suggest residual or recurrent disease in the neck. Postsurgical hypothyroidism   · Currently on evothyroxine to 200 mcg, 1 tab 6 days a week and 1/2 tab on Sunday   · Goal TSH <0.5  · Check TFT now     vitD deficiency   · continue vitD supplements   · Encourage Wt bearing exercise     Return in about 1 year (around 7/20/2021) for thyroid cancer . The above issues were reviewed with the patient who understood and agreed with the plan. Thank you for allowing us to participate in the care of this patient. Please do not hesitate to contact us with any additional questions. Diagnosis Orders   1.  Thyroid cancer ([facility] [address]  TSH without Reflex    T4, Free    TSH without Reflex    Thyroglobulin    T4, Free    Basic Metabolic Panel US Thyroid   2. Post-surgical hypothyroidism  TSH without Reflex    T4, Free    TSH without Reflex    T4, Free   3. Vitamin D deficiency  Basic Metabolic Panel       Miriam Naik MD  Endocrinologist, [facility])   [address] [facility], [address] [facility],[address] [address]   Phone: [phone]  Fax: [phone]  ----------------------------  An electronic signature was used to authenticate this note. Merlin Boys, MD on 7/20/2020 at [facility] were provided through a video synchronous discussion virtually to substitute for in-person clinic visit. Pursuant to the emergency declaration under the [facility], [facility] waiver authority and the [employer] Act, this Virtual  Visit was conducted, with patient's consent, to reduce the patient's risk of exposure to COVID-19 and provide continuity of care.

## 2020-07-20 NOTE — PROGRESS NOTES
Amrita Dash was read the following message We want to confirm that, for purposes of billing, this is a virtual visit with your provider for which we will submit a claim for reimbursement with your insurance company. You will be responsible for any copays, coinsurance amounts or other amounts not covered by your insurance company. If you do not accept this, unfortunately we will not be able to schedule a virtual visit with the provider. Do you accept?  Ninoska Vick responded Sharyle Shama

## 2020-07-30 RX ORDER — LEVOTHYROXINE SODIUM 0.2 MG/1
TABLET ORAL
Qty: 90 TABLET | Refills: 3 | Status: SHIPPED
Start: 2020-07-30 | End: 2021-06-28 | Stop reason: SDUPTHER

## 2021-02-11 DIAGNOSIS — C73 THYROID CANCER (HCC): ICD-10-CM

## 2021-03-09 ENCOUNTER — OFFICE VISIT (OUTPATIENT)
Dept: ENDOCRINOLOGY | Age: 61
End: 2021-03-09
Payer: COMMERCIAL

## 2021-03-09 VITALS
OXYGEN SATURATION: 96 % | SYSTOLIC BLOOD PRESSURE: 130 MMHG | HEART RATE: 70 BPM | TEMPERATURE: 98 F | BODY MASS INDEX: 32.85 KG/M2 | WEIGHT: 249 LBS | DIASTOLIC BLOOD PRESSURE: 80 MMHG

## 2021-03-09 DIAGNOSIS — E55.9 VITAMIN D DEFICIENCY: ICD-10-CM

## 2021-03-09 DIAGNOSIS — C73 THYROID CANCER (HCC): Primary | ICD-10-CM

## 2021-03-09 DIAGNOSIS — E89.0 POST-SURGICAL HYPOTHYROIDISM: ICD-10-CM

## 2021-03-09 PROCEDURE — 99214 OFFICE O/P EST MOD 30 MIN: CPT | Performed by: INTERNAL MEDICINE

## 2021-03-09 NOTE — PROGRESS NOTES
[address] [place] [address] Department of Endocrinology Diabetes and Metabolism   [address] [facility] [address]   Phone: [phone]  Fax: [phone]    Date of Service: 3/9/2021    Primary Care Physician: Mahnaz Kenney MD.  Provider: Fabiola Stauffer MD       Reason for the visit:  Metastatic thyroid cancer, post surgical hypothyroidism     History of Present Illness: The history is provided by the patient. No  was used. Accuracy of the patient data is excellent. Bhavna Kincaid is a very pleasant  56y.o. year old male seen today today for follow up visit on papillary thyroid cancer    He was first diagnosed with papillary thyroid cancer after a left hemithyroidectomy in December 2009; this showed a papillary thyroid cancer, diffuse sclerosing variant, focal clear cell morphology, 2.2 cm, stage IV, T4a N1a M0. There was evidence of capsular invasion in superior thyroid and paratracheal soft tissue and presumed angiolymphatic invasion, carcinoma was focally present at the resection margin. 2 lymph nodes were resected and found to be positive for metastatic disease with largest measuring 0.9 cm. The patient underwent right completion thyroidectomy in January 2010. Received GALINDO-ablation, with withdrawal, 150 mCi I-131 on January 27, 2010. Postablation whole body scan on February 03, 2010, showed possible left neck submandibular increased activity with questionable disease between umbilicus and pubic symphysis. March 31, 2010, neck CT did not show any focal abnormality in the submandibular region. November 05, 2010, thyroglobulin was 16, TSH was not documented at that time. November 03, 2010, Thyrogen stimulated whole body scan did not show the previously described activity in the thyroid bed or submandibular region. No abnormal activity was seen, negative for metastasis.      PET/CT on May 26, 2011, did not show any metastatic disease    July US 12/9/2019 at [facility] --> A right level 2 lymph node exhibits lobulation, heterogeneous hypoechoic appearance and no evidence of a central hyperechoic zone. Dimensions are 15 x 10 x 6 mm. A similar appearing left level 3 lymph node exhibits dimensions of 14 x 6 x 5 mm    1/15/2020 - TSH 0.011, Free T4 1.33, Tg-level 0.2 (negative Ab)     Thyrogen stim test 3/18/2020 -->  TSH 92.6, Tg level 0.4 with negative Tg-Ab     3/20/2020 - [facility] --> There is no abnormal increased tracer activity in the  right or left thyroid bed to suggest residual or recurrent disease in the neck. Currently on levothyroxine 200 mcg daily and 1/2 tab Sundary  Due for labs now       Tg  0.2 was 0.2 was 0.5 was 0.3 was 0.4   3/2020 - thyroidgen stim test Tg level 0.4, TSH 92.6    thyroid US 2/2021 at [facility] --> negative     The patient denies any new lumps, bumps in her neck, change in her voice, or shortness of breath    PAST MEDICAL HISTORY   Past Medical History:   Diagnosis Date    Hyperlipidemia     Hypertension     Post-surgical hypothyroidism     Thyroid cancer ([address]     Vitamin B12 deficiency     Vitamin D deficiency      PAST SURGICAL HISTORY   Past Surgical History:   Procedure Laterality Date    TOTAL THYROIDECTOMY      for thyroid cancer      SOCIAL HISTORY   Tobacco:   reports that he has quit smoking. He has never used smokeless tobacco.  Alcol:   reports current alcohol use of about 12.0 standard drinks of alcohol per week. Illicit Drugs:   reports no history of drug use.     FAMILY HISTORY   Family History   Problem Relation Age of Onset    Stroke Father     Cancer Father         Head/Neck/Throat    Cancer Sister         Testicular     Thyroid Cancer Neg Hx      ALLERGIES AND DRUG REACTIONS   Allergies   Allergen Reactions    Penicillins     Strawberry (Diagnostic)        CURRENT MEDICATIONS     Current Outpatient Medications   Medication Sig Dispense Refill    levothyroxine (SYNTHROID) 200 MCG tablet Take 1 tablet 6 days a week and on 1/2 tablet on Sunday 90 tablet 3    fluticasone (FLONASE) 50 MCG/ACT nasal spray 1 spray by Each Nostril route as needed       atenolol (TENORMIN) 25 MG tablet Take 25 mg by mouth daily 1 3rd of a 25 mg in the am and a 25 mg at night      pantoprazole (PROTONIX) 40 MG tablet Take 40 mg by mouth daily      aspirin 81 MG tablet Take 81 mg by mouth daily      lisinopril (PRINIVIL;ZESTRIL) 20 MG tablet Take 20 mg by mouth daily       Multiple Vitamin (MULTI-DAY VITAMINS PO) Take by mouth daily      simvastatin (ZOCOR) 10 MG tablet Take 10 mg by mouth nightly      Calcium-Magnesium 500-250 MG TABS Take by mouth daily      Coenzyme Q10 (COQ-10) 100 MG CAPS Take by mouth nightly      acyclovir (ZOVIRAX) 200 MG capsule Take 200 mg by mouth nightly      sildenafil (REVATIO) 20 MG tablet Take 20 mg by mouth as needed      gabapentin (NEURONTIN) 100 MG capsule Take 100 mg by mouth 3 times daily as needed. Dequan Rabago celecoxib (CELEBREX) 200 MG capsule Take 200 mg by mouth as needed for Pain      cyanocobalamin 1000 MCG/ML injection Inject 1,000 mcg into the muscle once Twice a month      Cholecalciferol (VITAMIN D3) 92207 units CAPS Take by mouth Twice a month      chlorthalidone (HYGROTON) 25 MG tablet Take 12.5 mg by mouth daily       No current facility-administered medications for this visit. Review of Systems  Constitutional: No fever, no chills, no diaphoresis, no generalized weakness. HEENT: No blurred vision, No sore throat, no ear pain, no hair loss  Neck: denied any neck swelling, difficulty swallowing,   Cadrdiopulomary: No CP, SOB or palpitation, No orthopnea or PND. No cough or wheezing. GI: No N/V/D, no constipation, No abdominal pain, no melena or hematochezia   : Denied any dysuria, hematuria, flank pain, discharge, or incontinence. Skin: denied any rash, ulcer, Hirsute, or hyperpigmentation.    MSK: denied any joint deformity, joint pain/swelling, muscle pain, or back pain.  Neuro: no numbess, no tingling, no weakness,     OBJECTIVE    /80   Pulse 70   Temp 98 °F (36.7 °C)   Wt 249 lb (112.9 kg)   SpO2 96%   BMI 32.85 kg/m²   BP Readings from Last 4 Encounters:   03/09/21 130/80   01/20/20 (!) 142/82   07/23/19 128/78     Wt Readings from Last 6 Encounters:   03/09/21 249 lb (112.9 kg)   01/20/20 254 lb 6.4 oz (115.4 kg)   07/23/19 250 lb (113.4 kg)   01/21/19 254 lb 6.4 oz (115.4 kg)     Physical examination:  General: awake alert, oriented x3, no abnormal position or movements. HEENT: normocephalic non traumatic  Neck: supple, no LN enlargement, s/p total thyroidectomy, no JVD. Pulm: Clear equal air entry no added sounds, no wheezing or rhonchi    CVS: S1 + S2, no murmur, no heave. Dorsalis pedis pulse palpable   Abd: soft lax, no tenderness, no organomegaly, audible bowel sounds.   Skin: warm, no lesions, no rash  MSK: No local spine tenderness   Neuro: CN intact, sensation notmal , muscle power normal. No tremors   Psych: normal mood, and affect    Review of Laboratory Data:  I have reviewed the following:  No results found for: WBC, RBC, HGB, HCT, MCV, MCH, MCHC, RDW, PLT, MPV, GRANULOCYTES, BANDS   Lab Results   Component Value Date/Time     01/15/2020    K 3.8 01/15/2020    CALCIUM 9.0 01/15/2020      Lab Results   Component Value Date/Time    TSH 92.600 03/18/2020    T4FREE 1.33 01/15/2020     No results found for: LABA1C, GLUCOSE, MALBCR, LABMICR, LABCREA  Lab Results   Component Value Date/Time    TSH 92.600 03/18/2020    T4FREE 1.33 01/15/2020     No results found for: PTH  No results found for: LABA1C  No results found for: VITD25    TSH W/REFLEX TO FREE T4 (01/15/2020 7:24 AM EST)  Component Value   TSH  0.011    FREE T4 (01/15/2020 7:24 AM EST)  Component Value   FREE T4 1.33     THYROGLOBULIN PANEL (01/15/2020 7:24 AM EST)  Component Value   Thyroglobulin 0.2 (L)     ASSESSMENT & RECOMMENDATIONS   Trae Haddad is a 56y.o. -year-old male who was diagnosed with metastatic papillary thyroid cancer in December 2009, stage Romario, T4a N1a M0. Metastatic Papillary thyroid Cancer   · He is a high risk patient with biochemical incomplete response. Doing better now   · Currently on levothyroxine to 200 mcg, 1 tab 6 days a week and 0.5  tab on Sunday   · Recent US 2/201 at [facility] was negative for recurrence   · Thyrogen stim test 3/18/2020 -->  TSH 92.6, Tg level 0.4 with negative Tg-Ab   · 3/20/2020 - [facility] --> There is no abnormal increased tracer activity in the  right or left thyroid bed to suggest residual or recurrent disease in the neck. Postsurgical hypothyroidism   · Currently on evothyroxine to 200 mcg, 1 tab 6 days a week and 0.5 tab on Sunday   · Goal TSH <0.5  · Check TFT now and adjust the dose if needed     vitD deficiency   · continue vitD supplements   · Encourage Wt bearing exercise     I personally reviewed external notes from PCP and other patient's care team providers, and personally interpreted labs associated with the above diagnosis. I also ordered labs to further assess and manage the above addressed medical conditions    Return in about 6 months (around 9/9/2021) for thyroid cancer, hypothyroidism. The above issues were reviewed with the patient who understood and agreed with the plan. Thank you for allowing us to participate in the care of this patient. Please do not hesitate to contact us with any additional questions. Diagnosis Orders   1. Thyroid cancer ([facility] [address]  TSH without Reflex    T4, Free    Thyroglobulin    TSH without Reflex    T4, Free    Thyroglobulin   2. Post-surgical hypothyroidism  TSH without Reflex    T4, Free    Thyroglobulin    TSH without Reflex    T4, Free   3. Vitamin D deficiency         Shawanda Wynn MD  Endocrinologist, [facility])   [address] [facility] [address]   Phone: [phone]  Fax: [phone]  ----------------------------  An electronic signature was used to authenticate this note.  Sharon Kerns Shahab Jackson MD on 3/9/2021 at 2:01 PM

## 2021-04-20 LAB — T4 FREE: 1.41

## 2021-04-21 LAB — TSH SERPL DL<=0.05 MIU/L-ACNC: 0.11 UIU/ML

## 2021-04-22 DIAGNOSIS — E89.0 POST-SURGICAL HYPOTHYROIDISM: ICD-10-CM

## 2021-04-22 DIAGNOSIS — C73 THYROID CANCER (HCC): ICD-10-CM

## 2021-04-23 ENCOUNTER — TELEPHONE (OUTPATIENT)
Dept: ENDOCRINOLOGY | Age: 61
End: 2021-04-23

## 2021-04-23 NOTE — TELEPHONE ENCOUNTER
Notify pt,  I have reviewed your recent results    Thyroid hormones are at goal, continue current synthroid dose

## 2021-05-04 DIAGNOSIS — C73 THYROID CANCER (HCC): ICD-10-CM

## 2021-05-08 ENCOUNTER — TELEPHONE (OUTPATIENT)
Dept: ENDOCRINOLOGY | Age: 61
End: 2021-05-08

## 2021-05-08 DIAGNOSIS — C73 THYROID CANCER (HCC): Primary | ICD-10-CM

## 2021-05-08 NOTE — TELEPHONE ENCOUNTER
Notify pt,  I have reviewed your recent results    Thyroid hormones are at goal but as usual they did thyroglobulin antibodies and not thyroglobulin level     I need tumor marker (Thyroglobulin) level

## 2021-06-28 DIAGNOSIS — C73 THYROID CANCER (HCC): ICD-10-CM

## 2021-06-28 DIAGNOSIS — E89.0 POST-SURGICAL HYPOTHYROIDISM: ICD-10-CM

## 2021-06-28 RX ORDER — LEVOTHYROXINE SODIUM 0.2 MG/1
TABLET ORAL
Qty: 90 TABLET | Refills: 3 | Status: SHIPPED
Start: 2021-06-28 | End: 2021-11-24 | Stop reason: SDUPTHER

## 2021-11-23 ENCOUNTER — OFFICE VISIT (OUTPATIENT)
Dept: ENDOCRINOLOGY | Age: 61
End: 2021-11-23
Payer: COMMERCIAL

## 2021-11-23 VITALS
BODY MASS INDEX: 32.97 KG/M2 | HEART RATE: 70 BPM | RESPIRATION RATE: 16 BRPM | DIASTOLIC BLOOD PRESSURE: 72 MMHG | WEIGHT: 248.8 LBS | HEIGHT: 73 IN | SYSTOLIC BLOOD PRESSURE: 120 MMHG

## 2021-11-23 DIAGNOSIS — E89.0 POST-SURGICAL HYPOTHYROIDISM: ICD-10-CM

## 2021-11-23 DIAGNOSIS — E55.9 VITAMIN D DEFICIENCY: ICD-10-CM

## 2021-11-23 DIAGNOSIS — C73 THYROID CANCER (HCC): ICD-10-CM

## 2021-11-23 DIAGNOSIS — C73 THYROID CANCER (HCC): Primary | ICD-10-CM

## 2021-11-23 LAB
T4 FREE: 1.92 NG/DL (ref 0.93–1.7)
TSH SERPL DL<=0.05 MIU/L-ACNC: 0.02 UIU/ML (ref 0.27–4.2)

## 2021-11-23 PROCEDURE — 99214 OFFICE O/P EST MOD 30 MIN: CPT | Performed by: INTERNAL MEDICINE

## 2021-11-23 NOTE — PROGRESS NOTES
[address] [place] [address] Department of Endocrinology Diabetes and Metabolism   [address] [facility] [address]   Phone: [phone]  Fax: [phone]    Date of Service: 11/23/2021  Primary Care Physician: Dori Perkins MD.  Provider: Sona Schultz MD       Reason for the visit:  Metastatic thyroid cancer, post surgical hypothyroidism     History of Present Illness: The history is provided by the patient. No  was used. Accuracy of the patient data is excellent. Mulugeta Barker is a very pleasant  59y.o. year old male seen today today for follow up visit on papillary thyroid cancer    He was first diagnosed with papillary thyroid cancer after a left hemithyroidectomy in December 2009; this showed a papillary thyroid cancer, diffuse sclerosing variant, focal clear cell morphology, 2.2 cm, stage IV, T4a N1a M0. There was evidence of capsular invasion in superior thyroid and paratracheal soft tissue and presumed angiolymphatic invasion, carcinoma was focally present at the resection margin. 2 lymph nodes were resected and found to be positive for metastatic disease with largest measuring 0.9 cm. The patient underwent right completion thyroidectomy in January 2010. Received GALINDO-ablation, with withdrawal, 150 mCi I-131 on January 27, 2010. Postablation whole body scan on February 03, 2010, showed possible left neck submandibular increased activity with questionable disease between umbilicus and pubic symphysis. March 31, 2010, neck CT did not show any focal abnormality in the submandibular region. November 05, 2010, thyroglobulin was 16, TSH was not documented at that time. November 03, 2010, Thyrogen stimulated whole body scan did not show the previously described activity in the thyroid bed or submandibular region. No abnormal activity was seen, negative for metastasis.      PET/CT on May 26, 2011, did not show any metastatic disease    July , Thyrogen stimulated thyroglobulin level was positive 11, with negative thyroglobulin antibodies. He was treated again with 154 mCi of I-131. Posttherapy scan on 2011, did not show any evidence of metastatic disease. 2011, thyroglobulin antibody was less than 20, thyroglobulin was 11.4, TSH was 78. 2011, neck CT did not show any lymphadenopathy or residual thyroid tissue. in 2012 , thyroglobulin level was detectable with suppressed TSH: TSH was 0.02, free T4 was 1.3, thyroglobulin was 1.4, with negative thyroglobulin antibodies. The thyroid ultrasound completed in [facility] suggested submandibular lymphadenopathy. At that time, we asked a second opinion from Dr. Danyelle Roque at [facility] , for further evaluation of detectable thyroglobulin levels and ultrasound-guided FNA of the cervical lymph node. Thyroid neck ultrasound on 2012 in [facility], showed 7 x 5 mm hypoechoic nodule in the left thyroidectomy bed. This lymph node was too small for FNA. Neck MRI on 2012, did not show any significant abnormality, the lymph nodes seen on the prior ultrasound in [facility] did not look enlarged. Thyroid ultrasound on 2012(in [facility]): unchanged 5 x 3 mm left level 6 central compartment nodule, there was no worrisome cervical lymphadenopathy. Thyroid ultrasound on 2013(by Dr Harshil Andrea): no local recurrence, no cervical SAROJ, right neck level 6-4 3 mm hypoechoic nodule.      2014: Thyroid US; negative  2014, Thyrogen stimulated T, TSH:85(Thyroge stimulated TG was 11 in )  Thyrogen stimulated Tg level; 2015 --> TSH 33, TG 2.6     Whole Body scan 2016 --> negative for any evidence of recurrence     Thyroid US 2015, 2016, 2017 and 2018  at [facility] --> No evidence of recurrence and no abnormal LN     Thyroid US 2019 at [facility] --> No evidence of recurrence or abnormal LN     Recent US 12/9/2019,  at [facility] --> A right level 2 lymph node exhibits lobulation, heterogeneous hypoechoic appearance and no evidence of a central hyperechoic zone. Dimensions are 15 x 10 x 6 mm. A similar appearing left level 3 lymph node exhibits dimensions of 14 x 6 x 5 mm    Last US 2/2021 --> negative for any evidence of recurrence or any abnormal LN     1/15/2020 - TSH 0.011, Free T4 1.33, Tg-level 0.2 (negative Ab)     Thyrogen stim test 3/18/2020 -->  TSH, FT4 1.7  92.6, Tg level 0.4 with negative Tg-Ab     3/20/2020 - [facility] --> There is no abnormal increased tracer activity in the  right or left thyroid bed to suggest residual or recurrent disease in the neck. 7/2021 - Tg level 0.1     9/2021 - TSH 0.006 , FT4 1.7     Currently on levothyroxine 200 mcg daily and 0.5 tab Sundary  Due for labs now       PAST MEDICAL HISTORY   Past Medical History:   Diagnosis Date    Hyperlipidemia     Hypertension     Post-surgical hypothyroidism     Thyroid cancer ([facility] [address]     Vitamin B12 deficiency     Vitamin D deficiency      PAST SURGICAL HISTORY   Past Surgical History:   Procedure Laterality Date    TOTAL THYROIDECTOMY      for thyroid cancer      SOCIAL HISTORY   Tobacco:   reports that he has quit smoking. He has never used smokeless tobacco.  Alcol:   reports current alcohol use of about 12.0 standard drinks of alcohol per week. Illicit Drugs:   reports no history of drug use.     FAMILY HISTORY   Family History   Problem Relation Age of Onset    Stroke Father     Cancer Father         Head/Neck/Throat    Cancer Sister         Testicular     Thyroid Cancer Neg Hx      ALLERGIES AND DRUG REACTIONS   Allergies   Allergen Reactions    Penicillins     Strawberry (Diagnostic)        CURRENT MEDICATIONS     Current Outpatient Medications   Medication Sig Dispense Refill    levothyroxine (SYNTHROID) 200 MCG tablet Take 1 tablet 6 days a week and on 1/2 tablet on Sunday 90 tablet 3    fluticasone (FLONASE) 50 MCG/ACT nasal spray 1 spray by Each Nostril route as needed       atenolol (TENORMIN) 25 MG tablet Take 25 mg by mouth daily 1 3rd of a 25 mg in the am and a 25 mg at night      pantoprazole (PROTONIX) 40 MG tablet Take 40 mg by mouth daily      aspirin 81 MG tablet Take 81 mg by mouth daily      lisinopril (PRINIVIL;ZESTRIL) 20 MG tablet Take 20 mg by mouth daily       Multiple Vitamin (MULTI-DAY VITAMINS PO) Take by mouth daily      simvastatin (ZOCOR) 10 MG tablet Take 10 mg by mouth nightly      Calcium-Magnesium 500-250 MG TABS Take by mouth daily      Coenzyme Q10 (COQ-10) 100 MG CAPS Take by mouth nightly      acyclovir (ZOVIRAX) 200 MG capsule Take 200 mg by mouth nightly      sildenafil (REVATIO) 20 MG tablet Take 20 mg by mouth as needed      gabapentin (NEURONTIN) 100 MG capsule Take 100 mg by mouth 3 times daily as needed. Nicholas Allen celecoxib (CELEBREX) 200 MG capsule Take 200 mg by mouth as needed for Pain      cyanocobalamin 1000 MCG/ML injection Inject 1,000 mcg into the muscle once Twice a month      Cholecalciferol (VITAMIN D3) 42997 units CAPS Take by mouth Twice a month      chlorthalidone (HYGROTON) 25 MG tablet Take 12.5 mg by mouth daily       No current facility-administered medications for this visit. Review of Systems  Constitutional: No fever, no chills, no diaphoresis, no generalized weakness. HEENT: No blurred vision, No sore throat, no ear pain, no hair loss  Neck: denied any neck swelling, difficulty swallowing,   Cadrdiopulomary: No CP, SOB or palpitation, No orthopnea or PND. No cough or wheezing. GI: No N/V/D, no constipation, No abdominal pain, no melena or hematochezia   : Denied any dysuria, hematuria, flank pain, discharge, or incontinence. Skin: denied any rash, ulcer, Hirsute, or hyperpigmentation. MSK: denied any joint deformity, joint pain/swelling, muscle pain, or back pain.   Neuro: no numbess, no tingling, no weakness,     OBJECTIVE    /72   Pulse 70 Resp 16   Ht 6' 1\" (1.854 m)   Wt 248 lb 12.8 oz (112.9 kg)   BMI 32.83 kg/m²   BP Readings from Last 4 Encounters:   11/23/21 120/72   03/09/21 130/80   01/20/20 (!) 142/82   07/23/19 128/78     Wt Readings from Last 6 Encounters:   11/23/21 248 lb 12.8 oz (112.9 kg)   03/09/21 249 lb (112.9 kg)   01/20/20 254 lb 6.4 oz (115.4 kg)   07/23/19 250 lb (113.4 kg)   01/21/19 254 lb 6.4 oz (115.4 kg)     Physical examination:  General: awake alert, oriented x3, no abnormal position or movements. HEENT: normocephalic non traumatic  Neck: supple, no LN enlargement, s/p total thyroidectomy, no JVD. Pulm: Clear equal air entry no added sounds, no wheezing or rhonchi    CVS: S1 + S2, no murmur, no heave. Dorsalis pedis pulse palpable   Abd: soft lax, no tenderness, no organomegaly, audible bowel sounds.   Skin: warm, no lesions, no rash  MSK: No local spine tenderness   Neuro: CN intact, sensation notmal , muscle power normal. No tremors   Psych: normal mood, and affect    Review of Laboratory Data:  I have reviewed the following:  No results found for: WBC, RBC, HGB, HCT, MCV, MCH, MCHC, RDW, PLT, MPV, GRANULOCYTES, BANDS   Lab Results   Component Value Date/Time     01/15/2020 12:00 AM    K 3.8 01/15/2020 12:00 AM    CALCIUM 9.0 01/15/2020 12:00 AM      Lab Results   Component Value Date/Time    TSH 0.11 04/21/2021 12:00 AM    T4FREE 1.41 04/20/2021 12:00 AM     No results found for: LABA1C, GLUCOSE, MALBCR, LABMICR, LABCREA  Lab Results   Component Value Date/Time    TSH 0.11 04/21/2021 12:00 AM    T4FREE 1.41 04/20/2021 12:00 AM     No results found for: PTH  No results found for: LABA1C  No results found for: VITD25    TSH W/REFLEX TO FREE T4 (01/15/2020 7:24 AM EST)  Component Value   TSH  0.011    FREE T4 (01/15/2020 7:24 AM EST)  Component Value   FREE T4 1.33     THYROGLOBULIN PANEL (01/15/2020 7:24 AM EST)  Component Value   Thyroglobulin 0.2 (L)     ASSESSMENT & RECOMMENDATIONS   Shreya Juarez is a 59 y.o. -year-old male who was diagnosed with metastatic papillary thyroid cancer in December 2009, stage Romario, T4a N1a M0. Metastatic Papillary thyroid Cancer   · He is a high risk patient with biochemical incomplete response. Doing better now   · Currently on levothyroxine to 200 mcg, 1 tab 6 days a week and 0.5  tab on Sunday   · Last US 2/2021 at [facility] was negative for recurrence. To repeat US in 2/2022   · 3/20/2020 - [facility] --> There is no abnormal increased tracer activity in the  right or left thyroid bed to suggest residual or recurrent disease in the neck. Postsurgical hypothyroidism   · Currently on evothyroxine to 200 mcg, 1 tab 6 days a week and 0.5 tab on Sunday   · Goal TSH <0.5  · Check TFT now and adjust the dose if needed     vitD deficiency   · continue vitD supplements   · Encourage Wt bearing exercise     I personally reviewed external notes from PCP and other patient's care team providers, and personally interpreted labs associated with the above diagnosis. I also ordered labs to further assess and manage the above addressed medical conditions    Return in about 7 months (around 6/23/2022) for hypothyroidism, thyroid cancer, VitD deficiency. The above issues were reviewed with the patient who understood and agreed with the plan. Thank you for allowing us to participate in the care of this patient. Please do not hesitate to contact us with any additional questions. Diagnosis Orders   1. Thyroid cancer ([facility] [address]  TSH without Reflex    T4, Free    Thyroglobulin    US THYROID   2. Post-surgical hypothyroidism  TSH without Reflex    T4, Free    Thyroglobulin   3. Vitamin D deficiency         Kristi Sharp MD  Endocrinologist, [facility])   [address] [place] [address], [address] [place] [address],[facility] [id number]   Phone: [phone]  Fax: [phone]  ----------------------------  An electronic signature was used to authenticate this note.  Nessa Neumann MD on 11/23/2021 at 2:16 PM

## 2021-11-24 ENCOUNTER — TELEPHONE (OUTPATIENT)
Dept: ENDOCRINOLOGY | Age: 61
End: 2021-11-24

## 2021-11-24 DIAGNOSIS — C73 THYROID CANCER (HCC): ICD-10-CM

## 2021-11-24 DIAGNOSIS — E89.0 POST-SURGICAL HYPOTHYROIDISM: Primary | ICD-10-CM

## 2021-11-24 RX ORDER — LEVOTHYROXINE SODIUM 0.2 MG/1
TABLET ORAL
Qty: 90 TABLET | Refills: 3 | Status: SHIPPED
Start: 2021-11-24 | End: 2022-07-25

## 2021-11-24 RX ORDER — LEVOTHYROXINE SODIUM 0.05 MG/1
TABLET ORAL
Qty: 12 TABLET | Refills: 3 | Status: SHIPPED
Start: 2021-11-24 | End: 2022-10-05

## 2021-11-24 NOTE — TELEPHONE ENCOUNTER
Called and reviewed results with PT. Mailed labs as pt may be out of town during the time the need labs done. Made pt aware to change dose from levothyroxine 200 mcg 6 days a week and 100 mcg on Sunday to 200 mcg 6 days a week and 50 mcg on Sunday .  Pt verbalized understanding

## 2021-11-24 NOTE — TELEPHONE ENCOUNTER
Notify pt,  I have reviewed your recent results    Your thyroid hormones are better than before but still above goal and high for your heart.      Please confirm he is currently on levothyroxine 200 mcg 6 days a week and 100 mcg on Sunday     If so, will tiny change the dose to 200 mcg 6 days a week and 50 mcg on Sunday     Recheck labs in 8 weeks

## 2021-11-26 LAB
THYROGLOBULIN AB: <0.9 IU/ML (ref 0–4)
THYROGLOBULIN BY LC-MS/MS, SERUM/PLASMA: ABNORMAL NG/ML (ref 1.3–31.8)
THYROGLOBULIN, SERUM OR PLASMA: 0.3 NG/ML (ref 1.3–31.8)

## 2021-11-28 ENCOUNTER — TELEPHONE (OUTPATIENT)
Dept: ENDOCRINOLOGY | Age: 61
End: 2021-11-28

## 2022-01-27 ENCOUNTER — TELEPHONE (OUTPATIENT)
Dept: ENDOCRINOLOGY | Age: 62
End: 2022-01-27

## 2022-01-30 NOTE — TELEPHONE ENCOUNTER
Notify pt,  I have reviewed your recent results    Great!, thyroid hormones results areat goal,  There is no need for a change in your therapy at this time.

## 2022-05-18 DIAGNOSIS — C73 THYROID CANCER (HCC): ICD-10-CM

## 2022-06-06 ENCOUNTER — TELEPHONE (OUTPATIENT)
Dept: ADMINISTRATIVE | Age: 62
End: 2022-06-06

## 2022-06-06 NOTE — TELEPHONE ENCOUNTER
Pt states somebody from the office called him and left a message, but he did not understand the message. Please call pt back. Thanks!

## 2022-07-24 DIAGNOSIS — E89.0 POST-SURGICAL HYPOTHYROIDISM: ICD-10-CM

## 2022-07-24 DIAGNOSIS — C73 THYROID CANCER (HCC): ICD-10-CM

## 2022-07-25 RX ORDER — LEVOTHYROXINE SODIUM 0.2 MG/1
TABLET ORAL
Qty: 90 TABLET | Refills: 0 | Status: SHIPPED
Start: 2022-07-25 | End: 2022-10-05 | Stop reason: SDUPTHER

## 2022-10-05 ENCOUNTER — TELEPHONE (OUTPATIENT)
Dept: ENDOCRINOLOGY | Age: 62
End: 2022-10-05

## 2022-10-05 ENCOUNTER — OFFICE VISIT (OUTPATIENT)
Dept: ENDOCRINOLOGY | Age: 62
End: 2022-10-05
Payer: COMMERCIAL

## 2022-10-05 VITALS
WEIGHT: 243 LBS | BODY MASS INDEX: 32.2 KG/M2 | HEART RATE: 69 BPM | HEIGHT: 73 IN | RESPIRATION RATE: 18 BRPM | OXYGEN SATURATION: 98 % | DIASTOLIC BLOOD PRESSURE: 82 MMHG | SYSTOLIC BLOOD PRESSURE: 136 MMHG

## 2022-10-05 DIAGNOSIS — C73 THYROID CANCER (HCC): ICD-10-CM

## 2022-10-05 DIAGNOSIS — R79.89 LOW TESTOSTERONE IN MALE: Primary | ICD-10-CM

## 2022-10-05 DIAGNOSIS — R79.89 LOW TESTOSTERONE IN MALE: ICD-10-CM

## 2022-10-05 DIAGNOSIS — E89.0 POST-SURGICAL HYPOTHYROIDISM: Primary | ICD-10-CM

## 2022-10-05 PROCEDURE — 99214 OFFICE O/P EST MOD 30 MIN: CPT | Performed by: INTERNAL MEDICINE

## 2022-10-05 RX ORDER — LEVOTHYROXINE SODIUM 0.2 MG/1
TABLET ORAL
Qty: 90 TABLET | Refills: 3 | Status: SHIPPED | OUTPATIENT
Start: 2022-10-05

## 2022-10-05 NOTE — PROGRESS NOTES
[address] [place] [address] Department of Endocrinology Diabetes and Metabolism   [address] [facility] [address]   Phone: [phone]  Fax: [phone]    Date of Service: 10/5/2022  Primary Care Physician: Errol Clark MD.  Provider: Chio Mendes MD       Reason for the visit:  Metastatic thyroid cancer, post surgical hypothyroidism     History of Present Illness: The history is provided by the patient. No  was used. Accuracy of the patient data is excellent. Melanie Olivas is a very pleasant  59y.o. year old male seen today today for follow up visit on papillary thyroid cancer    He was first diagnosed with papillary thyroid cancer after a left hemithyroidectomy in December 2009; this showed a papillary thyroid cancer, diffuse sclerosing variant, focal clear cell morphology, 2.2 cm, stage IV, T4a N1a M0. There was evidence of capsular invasion in superior thyroid and paratracheal soft tissue and presumed angiolymphatic invasion, carcinoma was focally present at the resection margin. 2 lymph nodes were resected and found to be positive for metastatic disease with largest measuring 0.9 cm. The patient underwent right completion thyroidectomy in January 2010. Received GALINDO-ablation, with withdrawal, 150 mCi I-131 on January 27, 2010. Postablation whole body scan on February 03, 2010, showed possible left neck submandibular increased activity with questionable disease between umbilicus and pubic symphysis. March 31, 2010, neck CT did not show any focal abnormality in the submandibular region. November 05, 2010, thyroglobulin was 16, TSH was not documented at that time. November 03, 2010, Thyrogen stimulated whole body scan did not show the previously described activity in the thyroid bed or submandibular region. No abnormal activity was seen, negative for metastasis.      PET/CT on May 26, 2011, did not show any metastatic disease    July , Thyrogen stimulated thyroglobulin level was positive 11, with negative thyroglobulin antibodies. He was treated again with 154 mCi of I-131. Posttherapy scan on 2011, did not show any evidence of metastatic disease. 2011, thyroglobulin antibody was less than 20, thyroglobulin was 11.4, TSH was 78. 2011, neck CT did not show any lymphadenopathy or residual thyroid tissue. in 2012 , thyroglobulin level was detectable with suppressed TSH: TSH was 0.02, free T4 was 1.3, thyroglobulin was 1.4, with negative thyroglobulin antibodies. The thyroid ultrasound completed in [facility] suggested submandibular lymphadenopathy. At that time, we asked a second opinion from Dr. James Archer at [facility] , for further evaluation of detectable thyroglobulin levels and ultrasound-guided FNA of the cervical lymph node. Thyroid neck ultrasound on 2012 in [facility], showed 7 x 5 mm hypoechoic nodule in the left thyroidectomy bed. This lymph node was too small for FNA    Neck MRI on 2012, did not show any significant abnormality, the lymph nodes seen on the prior ultrasound in [facility] did not look enlarged. Thyroid ultrasound on 2012(in [facility]): unchanged 5 x 3 mm left level 6 central compartment nodule, there was no worrisome cervical lymphadenopathy. Thyroid ultrasound on 2013(by Dr Luba Dowell): no local recurrence, no cervical SAROJ, right neck level 6-4 3 mm hypoechoic nodule.      2014: Thyroid US; negative  2014, Thyrogen stimulated T, TSH:85(Thyroge stimulated TG was 11 in )  Thyrogen stimulated Tg level; 2015 --> TSH 33, TG 2.6     Whole Body scan 2016 --> negative for any evidence of recurrence     Thyroid US 2015, 2016, 2017 and 2018  at [facility] --> No evidence of recurrence and no abnormal LN     Thyroid US 2019 at [address] [place] [address] --> No evidence of recurrence or abnormal LN     Recent US 12/9/2019,  at [facility] --> A right level 2 lymph node exhibits lobulation, heterogeneous hypoechoic appearance and no evidence of a central hyperechoic zone. Dimensions are 15 x 10 x 6 mm. A similar appearing left level 3 lymph node exhibits dimensions of 14 x 6 x 5 mm    Last US 2/2021 --> negative for any evidence of recurrence or any abnormal LN     1/15/2020 - TSH 0.011, Free T4 1.33, Tg-level 0.2 (negative Ab)     Thyrogen stim test 3/18/2020 -->  TSH, FT4 1.7  92.6, Tg level 0.4 with negative Tg-Ab     3/20/2020 - [facility] --> There is no abnormal increased tracer activity in the  right or left thyroid bed to suggest residual or recurrent disease in the neck. 7/2021 - Tg level 0.1     9/2021 - TSH 0.006 , FT4 1.7     Currently on levothyroxine 200 mcg daily and 0.5 tab Sundary  Due for labs now       PAST MEDICAL HISTORY   Past Medical History:   Diagnosis Date    Hyperlipidemia     Hypertension     Post-surgical hypothyroidism     Thyroid cancer ([facility] [address]     Vitamin B12 deficiency     Vitamin D deficiency      PAST SURGICAL HISTORY   Past Surgical History:   Procedure Laterality Date    TOTAL THYROIDECTOMY      for thyroid cancer      SOCIAL HISTORY   Tobacco:   reports that he has quit smoking. He has never used smokeless tobacco.  Alcol:   reports current alcohol use of about 12.0 standard drinks per week. Illicit Drugs:   reports no history of drug use.     FAMILY HISTORY   Family History   Problem Relation Age of Onset    Stroke Father     Cancer Father         Head/Neck/Throat    Cancer Sister         Testicular     Thyroid Cancer Neg Hx      ALLERGIES AND DRUG REACTIONS   Allergies   Allergen Reactions    Penicillins     Strawberry (Diagnostic)        CURRENT MEDICATIONS     Current Outpatient Medications   Medication Sig Dispense Refill    levothyroxine (SYNTHROID) 200 MCG tablet take 1 tablet by mouth 6 days a week, and 50 mcg on sunday 90 tablet 0    levothyroxine (SYNTHROID) 50 MCG tablet Take 1 tablet once a week on Sunday 12 tablet 3    fluticasone (FLONASE) 50 MCG/ACT nasal spray 1 spray by Each Nostril route as needed       atenolol (TENORMIN) 25 MG tablet Take 25 mg by mouth daily 1 3rd of a 25 mg in the am and a 25 mg at night      pantoprazole (PROTONIX) 40 MG tablet Take 40 mg by mouth daily      aspirin 81 MG tablet Take 81 mg by mouth daily      lisinopril (PRINIVIL;ZESTRIL) 20 MG tablet Take 20 mg by mouth daily       Multiple Vitamin (MULTI-DAY VITAMINS PO) Take by mouth daily      simvastatin (ZOCOR) 10 MG tablet Take 10 mg by mouth nightly      Calcium-Magnesium 500-250 MG TABS Take by mouth daily      Coenzyme Q10 (COQ-10) 100 MG CAPS Take by mouth nightly      acyclovir (ZOVIRAX) 200 MG capsule Take 200 mg by mouth nightly      sildenafil (REVATIO) 20 MG tablet Take 20 mg by mouth as needed      gabapentin (NEURONTIN) 100 MG capsule Take 100 mg by mouth 3 times daily as needed. .      celecoxib (CELEBREX) 200 MG capsule Take 200 mg by mouth as needed for Pain      cyanocobalamin 1000 MCG/ML injection Inject 1,000 mcg into the muscle once Twice a month      Cholecalciferol (VITAMIN D3) 69135 units CAPS Take by mouth Twice a month      chlorthalidone (HYGROTON) 25 MG tablet Take 12.5 mg by mouth daily       No current facility-administered medications for this visit. Review of Systems  Constitutional: No fever, no chills, no diaphoresis, no generalized weakness. HEENT: No blurred vision, No sore throat, no ear pain, no hair loss  Neck: denied any neck swelling, difficulty swallowing,   Cadrdiopulomary: No CP, SOB or palpitation, No orthopnea or PND. No cough or wheezing. GI: No N/V/D, no constipation, No abdominal pain, no melena or hematochezia   : Denied any dysuria, hematuria, flank pain, discharge, or incontinence. Skin: denied any rash, ulcer, Hirsute, or hyperpigmentation. MSK: denied any joint deformity, joint pain/swelling, muscle pain, or back pain.   Neuro: no numbess, no tingling, no weakness,     OBJECTIVE    /82   Pulse 69   Resp 18   Ht 6' 1\" (1.854 m)   Wt 243 lb (110.2 kg)   SpO2 98%   BMI 32.06 kg/m²   BP Readings from Last 4 Encounters:   10/05/22 136/82   11/23/21 120/72   03/09/21 130/80   01/20/20 (!) 142/82     Wt Readings from Last 6 Encounters:   10/05/22 243 lb (110.2 kg)   11/23/21 248 lb 12.8 oz (112.9 kg)   03/09/21 249 lb (112.9 kg)   01/20/20 254 lb 6.4 oz (115.4 kg)   07/23/19 250 lb (113.4 kg)   01/21/19 254 lb 6.4 oz (115.4 kg)     Physical examination:  General: awake alert, oriented x3, no abnormal position or movements. HEENT: normocephalic non traumatic  Neck: supple, no LN enlargement, s/p total thyroidectomy, no JVD. Pulm: Clear equal air entry no added sounds, no wheezing or rhonchi    CVS: S1 + S2, no murmur, no heave. Dorsalis pedis pulse palpable   Abd: soft lax, no tenderness, no organomegaly, audible bowel sounds.   Skin: warm, no lesions, no rash  MSK: No local spine tenderness   Neuro: CN intact, sensation notmal , muscle power normal. No tremors   Psych: normal mood, and affect    Review of Laboratory Data:  I have reviewed the following:  COMPREHENSIVE METABOLIC PANEL (CMP)  Order: [id number]   Ref Range & Units 9/23/22 0635   Glucose 74 - 100 mg/dL 120 High     Urea Nitrogen 7 - 18 mg/dL 17    Creatinine 0.55 - 1.3 mg/dL 1.05    BUN/CREATININE RATIO  16    Total Bilirubin 0.2 - 1 mg/dL 0.7    Total Protein 6.4 - 8.2 g/dL 7.0    Albumin 3.4 - 5 g/dL 3.7    A/G RATIO ratio 1.1    Calcium(Ca) 8.5 - 10.1 mg/dL 8.9    Alkaline Phosphatase 45 - 117 U/L 67    Aspartate Aminot.(AST) 15 - 37 U/L 23    Alanine Aminotrans(ALT) 13 - 61 U/L 30    Sodium(Na) 136 - 145 mEq/L 139    Potassium(K) 3.5 - 5 mmol/L 3.8    Chloride(Cl) 98 - 107 mmoL/L 105    Carbon Dioxide(CO2) 21 - 32 mmoL/L 28.0    ANION GAP 5 - 15 mEq/L 9.8    CALCULATED OSMOLALITY 270 - 295 mOs/kg 291    EGFR (CKD-EPI) >59 mL/min/1.73m2 80    CBC (INCLUDES DIFFERENTIAL AND PLATELETS)  Order: [id number]   Ref Range & Units 9/23/22 0635   WBC 4.8 - 10.8 th/cumm 5.1    RBC 4.7 - 6.1 mil/mm3 4.81    Hgb 14 - 18 g/dL 14.7    Hematocrit(HCT) 42 - 52 % 42.6    MCV 80 - 94 fL 88.6    MCH 28.6 - 32.8 pg 30.5    MCHC 33 - 37 g/dL 34.4    RDW 11.5 - 14.5 % 14.1    Mean Platelet Volume 7.4 - 10.4 um3 8.8    Platelets 343 - 025 th/mm3 266    Diff Type  Automated Differential    Neutrophils % 50.7    Lymphocytes % 37.9    Monocytes % 8.1    Eosinophils % 1.6    Basophils % 1.7    ABS Neutrophils 1.8 - 7.7 th/cumm 2.6    ABS Lymphocytes 1 - 4.8 TH/mm3 1.9    ABS Monocytes 0 - 1 TH/mm3 0.4    ABS Eosinophils 0 - 0.7 TH/mm3 0.1    ABS Basophils 0 - 0.2 TH/mm3 0.1      No results found for: WBC, RBC, HGB, HCT, MCV, MCH, MCHC, RDW, PLT, MPV, GRANULOCYTES, KALPESH, BANDS   Lab Results   Component Value Date/Time     01/15/2020 12:00 AM    K 3.8 01/15/2020 12:00 AM    CALCIUM 9.0 01/15/2020 12:00 AM      Lab Results   Component Value Date/Time    TSH 0.022 (L) 11/23/2021 02:31 PM    T4FREE 1.92 (H) 11/23/2021 02:31 PM    THGAB <0.9 11/23/2021 02:31 PM     No results found for: LABA1C, GLUCOSE, MALBCR, LABMICR, LABCREA  Lab Results   Component Value Date/Time    TSH 0.022 (L) 11/23/2021 02:31 PM    T4FREE 1.92 (H) 11/23/2021 02:31 PM    THGAB <0.9 11/23/2021 02:31 PM     No results found for: PTH  No results found for: LABA1C  No results found for: VITD25    TSH W/REFLEX TO FREE T4 (01/15/2020 7:24 AM EST)  Component Value   TSH  0.011    FREE T4 (01/15/2020 7:24 AM EST)  Component Value   FREE T4 1.33     THYROGLOBULIN PANEL (01/15/2020 7:24 AM EST)  Component Value   Thyroglobulin 0.2 (L)     ASSESSMENT & RECOMMENDATIONS   Ludin Rothman is a 59y.o. -year-old male who was diagnosed with metastatic papillary thyroid cancer in December 2009, stage Romario, T4a N1a M0. Metastatic Papillary thyroid Cancer   He is a high risk patient with biochemical incomplete response.  Doing better now   Currently on levothyroxine to 200 mcg, 1 tab 5days a week and 0.5  tab on Sunday   Last US 2/2021 at [facility] was negative for recurrence. To repeat US in 2/2022   3/20/2020 - [facility] --> There is no abnormal increased tracer activity in the  right or left thyroid bed to suggest residual or recurrent disease in the neck. Postsurgical hypothyroidism   Currently on evothyroxine to 200 mcg, 1 tab 6 days a week and 0.5 tab on Sunday  Check TFT now and adjust the dose if needed     vitD deficiency   continue vitD supplements   Encourage Wt bearing exercise     Male hypogonadism   Check Testosterone level     I personally reviewed external notes from PCP and other patient's care team providers, and personally interpreted labs associated with the above diagnosis. I also ordered labs to further assess and manage the above addressed medical conditions    Return in about 8 months (around 6/5/2023) for hypothyroidism, thyroid cancer, VitD deficiency. The above issues were reviewed with the patient who understood and agreed with the plan. Thank you for allowing us to participate in the care of this patient. Please do not hesitate to contact us with any additional questions   Diagnosis Orders   1. Post-surgical hypothyroidism  levothyroxine (SYNTHROID) 200 MCG tablet      2. Thyroid cancer (HCC)  THYROGLOBULIN    levothyroxine (SYNTHROID) 200 MCG tablet      3. Low testosterone in male  Testosterone, free, total    Sex Hormone Binding Globulin    Hematocrit    Luteinizing Hormone    Follicle Stimulating Hormone    Prolactin        Marianne Teixeira MD  Endocrinologist, [facility])   [address] [facility], [address] [place] [address],[facility] [address] [id number]   Phone: [phone]  Fax: [phone]  ----------------------------  An electronic signature was used to authenticate this note.  David Claudio MD on 10/5/2022 at 9:18 AM

## 2022-10-06 NOTE — TELEPHONE ENCOUNTER
Endocrine staff,   Please notify pt that I have reviewed your recent results.        Iron study was normal on this test. Because level was abnormal on previous labs, I recommend recheck iron study in few weeks and contact our PCP if level was elevated

## 2022-10-11 ENCOUNTER — TELEPHONE (OUTPATIENT)
Dept: ENDOCRINOLOGY | Age: 62
End: 2022-10-11

## 2022-10-11 NOTE — TELEPHONE ENCOUNTER
Endocrine staff,   Please notify pt that I have reviewed your recent results.      Excellent thyroid tumor marker was very low and stable comparing th previous work up

## 2022-11-03 ENCOUNTER — TELEPHONE (OUTPATIENT)
Dept: ENDOCRINOLOGY | Age: 62
End: 2022-11-03

## 2022-11-09 ENCOUNTER — TELEPHONE (OUTPATIENT)
Dept: ENDOCRINOLOGY | Age: 62
End: 2022-11-09

## 2023-05-23 ENCOUNTER — OFFICE VISIT (OUTPATIENT)
Dept: ENDOCRINOLOGY | Age: 63
End: 2023-05-23
Payer: COMMERCIAL

## 2023-05-23 VITALS
BODY MASS INDEX: 33 KG/M2 | SYSTOLIC BLOOD PRESSURE: 150 MMHG | HEIGHT: 73 IN | WEIGHT: 249 LBS | OXYGEN SATURATION: 98 % | HEART RATE: 56 BPM | DIASTOLIC BLOOD PRESSURE: 76 MMHG

## 2023-05-23 DIAGNOSIS — E89.0 POST-SURGICAL HYPOTHYROIDISM: ICD-10-CM

## 2023-05-23 DIAGNOSIS — C73 THYROID CANCER (HCC): ICD-10-CM

## 2023-05-23 DIAGNOSIS — R73.03 PREDIABETES: Primary | ICD-10-CM

## 2023-05-23 PROCEDURE — 99214 OFFICE O/P EST MOD 30 MIN: CPT | Performed by: INTERNAL MEDICINE

## 2023-05-23 RX ORDER — LEVOTHYROXINE SODIUM 0.2 MG/1
TABLET ORAL
Qty: 90 TABLET | Refills: 3 | Status: SHIPPED | OUTPATIENT
Start: 2023-05-23

## 2023-05-23 NOTE — PROGRESS NOTES
[address] [place] [address] Department of Endocrinology Diabetes and Metabolism   [address] [facility] [address]   Phone: [phone]  Fax: [phone]    Date of Service: 5/23/2023  Primary Care Physician: No primary care provider on file. .  Provider: Mony Patel MD       Reason for the visit:  Metastatic thyroid cancer, post surgical hypothyroidism     History of Present Illness: The history is provided by the patient. No  was used. Accuracy of the patient data is excellent. Susanna Rueda is a very pleasant  57y.o. year old male seen today today for follow up visit on papillary thyroid cancer    He was first diagnosed with papillary thyroid cancer after a left hemithyroidectomy in December 2009; this showed a papillary thyroid cancer, diffuse sclerosing variant, focal clear cell morphology, 2.2 cm, stage IV, T4a N1a M0. There was evidence of capsular invasion in superior thyroid and paratracheal soft tissue and presumed angiolymphatic invasion, carcinoma was focally present at the resection margin. 2 lymph nodes were resected and found to be positive for metastatic disease with largest measuring 0.9 cm. The patient underwent right completion thyroidectomy in January 2010. Received GALINDO-ablation, with withdrawal, 150 mCi I-131 on January 27, 2010. Postablation whole body scan on February 03, 2010, showed possible left neck submandibular increased activity with questionable disease between umbilicus and pubic symphysis. March 31, 2010, neck CT did not show any focal abnormality in the submandibular region. November 05, 2010, thyroglobulin was 16, TSH was not documented at that time. November 03, 2010, Thyrogen stimulated whole body scan did not show the previously described activity in the thyroid bed or submandibular region. No abnormal activity was seen, negative for metastasis.      PET/CT on May 26, 2011, did not show any metastatic

## 2023-05-26 LAB
THYROGLOB AB SERPL-ACNC: <0.9 IU/ML (ref 0–4)
THYROGLOB SERPL-MCNC: 0.2 NG/ML (ref 1.3–31.8)
THYROGLOB SERPL-MCNC: ABNORMAL NG/ML (ref 1.3–31.8)

## 2023-05-29 ENCOUNTER — TELEPHONE (OUTPATIENT)
Dept: ENDOCRINOLOGY | Age: 63
End: 2023-05-29

## 2023-05-29 NOTE — TELEPHONE ENCOUNTER
Endocrine staff,   Please notify pt that I have reviewed your recent results. Great!  Thyroid tumor marker was very  low

## 2023-05-30 DIAGNOSIS — R73.03 PREDIABETES: Primary | ICD-10-CM

## 2023-05-31 RX ORDER — SEMAGLUTIDE 0.5 MG/.5ML
0.5 INJECTION, SOLUTION SUBCUTANEOUS
Qty: 2 ML | Refills: 0 | Status: SHIPPED | OUTPATIENT
Start: 2023-05-31

## 2023-10-19 DIAGNOSIS — E89.0 POST-SURGICAL HYPOTHYROIDISM: ICD-10-CM

## 2023-10-19 DIAGNOSIS — C73 THYROID CANCER (HCC): ICD-10-CM

## 2023-10-20 RX ORDER — LEVOTHYROXINE SODIUM 0.2 MG/1
TABLET ORAL
Qty: 90 TABLET | Refills: 3 | Status: SHIPPED | OUTPATIENT
Start: 2023-10-20

## 2023-12-13 ENCOUNTER — PATIENT MESSAGE (OUTPATIENT)
Dept: ENDOCRINOLOGY | Age: 63
End: 2023-12-13

## 2023-12-13 DIAGNOSIS — C73 THYROID CANCER (HCC): Primary | ICD-10-CM

## 2023-12-13 NOTE — TELEPHONE ENCOUNTER
From: Salvador Goldstein  To: Dr. Penn Herrick: 12/13/2023 9:43 AM EST  Subject: blood work 12/12/23 at [facility]    Dr. Renita Villela I had the bloodwork done and ate a large breakfast 1 hour before it was done. My pcp Kobi Boyd had levels take the month before and fasting bg was 109 (12 hour fast) and A1C was 6.7. She has put me on Ozempic . 25mg one week before these levels were taken. Nonfasting  and A1C 6.1. See you in May. I think I need an order for a thyroid ultra sound, then I can make an Appt. to have it done before your visit.  ThanksHarris

## 2024-05-23 ENCOUNTER — OFFICE VISIT (OUTPATIENT)
Dept: ENDOCRINOLOGY | Age: 64
End: 2024-05-23
Payer: MEDICARE

## 2024-05-23 VITALS
SYSTOLIC BLOOD PRESSURE: 128 MMHG | HEART RATE: 72 BPM | RESPIRATION RATE: 18 BRPM | HEIGHT: 73 IN | BODY MASS INDEX: 30.09 KG/M2 | WEIGHT: 227 LBS | DIASTOLIC BLOOD PRESSURE: 76 MMHG | OXYGEN SATURATION: 99 %

## 2024-05-23 DIAGNOSIS — C73 THYROID CANCER (HCC): ICD-10-CM

## 2024-05-23 DIAGNOSIS — E89.0 POST-SURGICAL HYPOTHYROIDISM: ICD-10-CM

## 2024-05-23 DIAGNOSIS — R09.89 BRUIT OF LEFT CAROTID ARTERY: Primary | ICD-10-CM

## 2024-05-23 PROCEDURE — 99214 OFFICE O/P EST MOD 30 MIN: CPT | Performed by: INTERNAL MEDICINE

## 2024-05-23 PROCEDURE — 1123F ACP DISCUSS/DSCN MKR DOCD: CPT | Performed by: INTERNAL MEDICINE

## 2024-05-23 RX ORDER — LEVOTHYROXINE SODIUM 0.2 MG/1
TABLET ORAL
Qty: 90 TABLET | Refills: 3 | Status: SHIPPED | OUTPATIENT
Start: 2024-07-24

## 2024-05-23 RX ORDER — SEMAGLUTIDE 1.34 MG/ML
INJECTION, SOLUTION SUBCUTANEOUS
Qty: 3 ML | Refills: 11 | Status: SHIPPED | OUTPATIENT
Start: 2024-05-23

## 2024-05-23 NOTE — PROGRESS NOTES
[employer] [employer] [facility] Department of Endocrinology Diabetes and Metabolism   [address] [place] [address]   Phone: [phone]  Fax: [phone]    Date of Service: 5/23/2024  Primary Care Physician: No primary care provider on file..  Provider: Bang Camarena MD       Reason for the visit:  Metastatic thyroid cancer, post surgical hypothyroidism     History of Present Illness:  The history is provided by the patient. No  was used. Accuracy of the patient data is excellent.    Lm Mcintosh is a very pleasant  63 y.o. year old male seen today today for follow up visit on papillary thyroid cancer    He was first diagnosed with papillary thyroid cancer after a left hemithyroidectomy in December 2009; this showed a papillary thyroid cancer, diffuse sclerosing variant, focal clear cell morphology, 2.2 cm, stage IV, T4a N1a M0. There was evidence of capsular invasion in superior thyroid and paratracheal soft tissue and presumed angiolymphatic invasion, carcinoma was focally present at the resection margin.    2 lymph nodes were resected and found to be positive for metastatic disease with largest measuring 0.9 cm.     The patient underwent right completion thyroidectomy in January 2010.     Received GALINDO-ablation, with withdrawal, 150 mCi I-131 on January 27, 2010.     Postablation whole body scan on February 03, 2010, showed possible left neck submandibular increased activity with questionable disease between umbilicus and pubic symphysis.     March 31, 2010, neck CT did not show any focal abnormality in the submandibular region.     November 05, 2010, thyroglobulin was 16, TSH was not documented at that time.     November 03, 2010, Thyrogen stimulated whole body scan did not show the previously described activity in the thyroid bed or submandibular region. No abnormal activity was seen, negative for metastasis.     PET/CT on May 26, 2011, did not show any metastatic

## 2024-06-06 ENCOUNTER — PATIENT MESSAGE (OUTPATIENT)
Dept: ENDOCRINOLOGY | Age: 64
End: 2024-06-06

## 2024-06-06 ENCOUNTER — TELEPHONE (OUTPATIENT)
Dept: ENDOCRINOLOGY | Age: 64
End: 2024-06-06

## 2024-06-06 NOTE — TELEPHONE ENCOUNTER
There is mild partially calcified plaque per report.  I advise pt to follow up with PCP to review as well

## 2024-06-06 NOTE — TELEPHONE ENCOUNTER
----- Message from Lm Mcintosh sent at 6/6/2024  9:01 AM EDT -----  Regarding: ultrasound of carotids at [facility]  Contact: [phone]  I was wondering if the results of the ultrasound were sent to you.  Thanks, Harris Mcintosh

## 2025-01-01 RX ORDER — SEMAGLUTIDE 1.34 MG/ML
INJECTION, SOLUTION SUBCUTANEOUS
Qty: 3 ML | Refills: 11 | Status: CANCELLED | OUTPATIENT
Start: 2025-01-01

## 2025-01-02 NOTE — TELEPHONE ENCOUNTER
Patient is requesting an increase in ozempic 0.5mg, states BS and A1c are good but he is not losing weight

## 2025-04-09 DIAGNOSIS — E89.0 POST-SURGICAL HYPOTHYROIDISM: ICD-10-CM

## 2025-04-09 DIAGNOSIS — C73 THYROID CANCER (HCC): ICD-10-CM

## 2025-04-11 RX ORDER — LEVOTHYROXINE SODIUM 200 UG/1
TABLET ORAL
Qty: 90 TABLET | Refills: 3 | Status: SHIPPED | OUTPATIENT
Start: 2025-04-11

## 2025-05-22 ENCOUNTER — OFFICE VISIT (OUTPATIENT)
Dept: ENDOCRINOLOGY | Age: 65
End: 2025-05-22
Payer: MEDICARE

## 2025-05-22 VITALS
WEIGHT: 224 LBS | OXYGEN SATURATION: 99 % | RESPIRATION RATE: 18 BRPM | DIASTOLIC BLOOD PRESSURE: 85 MMHG | SYSTOLIC BLOOD PRESSURE: 158 MMHG | HEIGHT: 73 IN | BODY MASS INDEX: 29.69 KG/M2 | HEART RATE: 71 BPM | TEMPERATURE: 98.4 F

## 2025-05-22 DIAGNOSIS — C73 THYROID CANCER (HCC): Primary | ICD-10-CM

## 2025-05-22 DIAGNOSIS — E89.0 POST-SURGICAL HYPOTHYROIDISM: ICD-10-CM

## 2025-05-22 PROCEDURE — 1159F MED LIST DOCD IN RCRD: CPT | Performed by: INTERNAL MEDICINE

## 2025-05-22 PROCEDURE — G2211 COMPLEX E/M VISIT ADD ON: HCPCS | Performed by: INTERNAL MEDICINE

## 2025-05-22 PROCEDURE — 1123F ACP DISCUSS/DSCN MKR DOCD: CPT | Performed by: INTERNAL MEDICINE

## 2025-05-22 PROCEDURE — 99214 OFFICE O/P EST MOD 30 MIN: CPT | Performed by: INTERNAL MEDICINE

## 2025-05-22 RX ORDER — LEVOTHYROXINE SODIUM 200 UG/1
TABLET ORAL
Qty: 90 TABLET | Refills: 3 | Status: SHIPPED | OUTPATIENT
Start: 2025-05-22

## 2025-05-22 NOTE — PROGRESS NOTES
[employer] [facility] Department of Endocrinology Diabetes and Metabolism   [address] [place] [address]   Phone: [phone]  Fax: [phone]    Date of Service: 5/22/2025  Primary Care Physician: No primary care provider on file..  Provider: Bang Camarena MD       Reason for the visit:  Metastatic thyroid cancer, post surgical hypothyroidism     History of Present Illness:  The history is provided by the patient. No  was used. Accuracy of the patient data is excellent.    Lm Mcintosh is a very pleasant  64 y.o. year old male seen today today for follow up visit on papillary thyroid cancer    He was first diagnosed with papillary thyroid cancer after a left hemithyroidectomy in December 2009; this showed a papillary thyroid cancer, diffuse sclerosing variant, focal clear cell morphology, 2.2 cm, stage IV, T4a N1a M0. There was evidence of capsular invasion in superior thyroid and paratracheal soft tissue and presumed angiolymphatic invasion, carcinoma was focally present at the resection margin.    2 lymph nodes were resected and found to be positive for metastatic disease with largest measuring 0.9 cm.     The patient underwent right completion thyroidectomy in January 2010.     Received GALINDO-ablation, with withdrawal, 150 mCi I-131 on January 27, 2010.     Postablation whole body scan on February 03, 2010, showed possible left neck submandibular increased activity with questionable disease between umbilicus and pubic symphysis.     March 31, 2010, neck CT did not show any focal abnormality in the submandibular region.     November 05, 2010, thyroglobulin was 16, TSH was not documented at that time.     November 03, 2010, Thyrogen stimulated whole body scan did not show the previously described activity in the thyroid bed or submandibular region. No abnormal activity was seen, negative for metastasis.     PET/CT on May 26, 2011, did not show any metastatic

## 2025-05-27 DIAGNOSIS — E89.0 POST-SURGICAL HYPOTHYROIDISM: ICD-10-CM

## 2025-05-27 DIAGNOSIS — C73 THYROID CANCER (HCC): Primary | ICD-10-CM

## 2025-07-24 ENCOUNTER — TELEPHONE (OUTPATIENT)
Dept: ADMINISTRATIVE | Age: 65
End: 2025-07-24

## 2025-08-07 ENCOUNTER — OFFICE VISIT (OUTPATIENT)
Dept: ENDOCRINOLOGY | Age: 65
End: 2025-08-07
Payer: MEDICARE

## 2025-08-07 VITALS
OXYGEN SATURATION: 100 % | BODY MASS INDEX: 29.29 KG/M2 | HEART RATE: 74 BPM | SYSTOLIC BLOOD PRESSURE: 152 MMHG | DIASTOLIC BLOOD PRESSURE: 93 MMHG | HEIGHT: 73 IN | WEIGHT: 221 LBS

## 2025-08-07 DIAGNOSIS — C73 THYROID CANCER (HCC): Primary | ICD-10-CM

## 2025-08-07 DIAGNOSIS — E89.0 POST-SURGICAL HYPOTHYROIDISM: ICD-10-CM

## 2025-08-07 DIAGNOSIS — E11.65 TYPE 2 DIABETES MELLITUS WITH HYPERGLYCEMIA, UNSPECIFIED WHETHER LONG TERM INSULIN USE (HCC): Primary | ICD-10-CM

## 2025-08-07 DIAGNOSIS — C73 THYROID CANCER (HCC): ICD-10-CM

## 2025-08-07 PROCEDURE — 99214 OFFICE O/P EST MOD 30 MIN: CPT | Performed by: INTERNAL MEDICINE

## 2025-08-07 PROCEDURE — G2211 COMPLEX E/M VISIT ADD ON: HCPCS | Performed by: INTERNAL MEDICINE

## 2025-08-07 PROCEDURE — 1159F MED LIST DOCD IN RCRD: CPT | Performed by: INTERNAL MEDICINE

## 2025-08-07 PROCEDURE — 1123F ACP DISCUSS/DSCN MKR DOCD: CPT | Performed by: INTERNAL MEDICINE

## 2025-08-07 RX ORDER — LEVOTHYROXINE SODIUM 200 UG/1
TABLET ORAL
Qty: 90 TABLET | Refills: 3 | Status: SHIPPED | OUTPATIENT
Start: 2025-08-07

## 2025-08-07 RX ORDER — SEMAGLUTIDE 1.34 MG/ML
INJECTION, SOLUTION SUBCUTANEOUS
Qty: 3 ML | Refills: 11 | Status: SHIPPED | OUTPATIENT
Start: 2025-08-07

## 2025-08-07 RX ORDER — THYROTROPIN ALFA 0.9 MG/ML
INJECTION, POWDER, FOR SOLUTION INTRAMUSCULAR
Qty: 2 EACH | Refills: 0 | Status: SHIPPED | OUTPATIENT
Start: 2025-08-07

## 2025-08-15 ENCOUNTER — PATIENT MESSAGE (OUTPATIENT)
Dept: ENDOCRINOLOGY | Age: 65
End: 2025-08-15

## 2025-08-18 ENCOUNTER — TELEPHONE (OUTPATIENT)
Dept: INFUSION THERAPY | Age: 65
End: 2025-08-18

## 2025-08-18 DIAGNOSIS — C73 THYROID CANCER (HCC): ICD-10-CM

## 2025-08-18 RX ORDER — THYROTROPIN ALFA 0.9 MG/ML
INJECTION, POWDER, FOR SOLUTION INTRAMUSCULAR
Qty: 2 EACH | Refills: 0 | Status: SHIPPED | OUTPATIENT
Start: 2025-08-18